# Patient Record
Sex: FEMALE | Race: ASIAN | NOT HISPANIC OR LATINO | Employment: FULL TIME | URBAN - METROPOLITAN AREA
[De-identification: names, ages, dates, MRNs, and addresses within clinical notes are randomized per-mention and may not be internally consistent; named-entity substitution may affect disease eponyms.]

---

## 2020-12-10 PROCEDURE — U0004 COV-19 TEST NON-CDC HGH THRU: HCPCS | Performed by: PHYSICIAN ASSISTANT

## 2021-12-14 ENCOUNTER — LAB (OUTPATIENT)
Dept: LAB | Facility: HOSPITAL | Age: 31
End: 2021-12-14

## 2021-12-14 ENCOUNTER — INITIAL PRENATAL (OUTPATIENT)
Dept: OBSTETRICS AND GYNECOLOGY | Facility: CLINIC | Age: 31
End: 2021-12-14

## 2021-12-14 VITALS
HEIGHT: 59 IN | BODY MASS INDEX: 31 KG/M2 | DIASTOLIC BLOOD PRESSURE: 74 MMHG | WEIGHT: 153.8 LBS | SYSTOLIC BLOOD PRESSURE: 116 MMHG

## 2021-12-14 DIAGNOSIS — Z34.81 PRENATAL CARE, SUBSEQUENT PREGNANCY IN FIRST TRIMESTER: Primary | ICD-10-CM

## 2021-12-14 DIAGNOSIS — O09.291 HISTORY OF PREGNANCY LOSS IN PRIOR PREGNANCY, CURRENTLY PREGNANT IN FIRST TRIMESTER: ICD-10-CM

## 2021-12-14 DIAGNOSIS — Z34.81 PRENATAL CARE, SUBSEQUENT PREGNANCY IN FIRST TRIMESTER: ICD-10-CM

## 2021-12-14 PROBLEM — O09.292: Status: ACTIVE | Noted: 2021-12-14

## 2021-12-14 LAB
ABO GROUP BLD: NORMAL
AMPHET+METHAMPHET UR QL: NEGATIVE
AMPHETAMINES UR QL: NEGATIVE
BARBITURATES UR QL SCN: NEGATIVE
BASOPHILS # BLD AUTO: 0.04 10*3/MM3 (ref 0–0.2)
BASOPHILS NFR BLD AUTO: 0.5 % (ref 0–1.5)
BENZODIAZ UR QL SCN: NEGATIVE
BLD GP AB SCN SERPL QL: NEGATIVE
BUPRENORPHINE SERPL-MCNC: NEGATIVE NG/ML
C TRACH RRNA SPEC DONR QL NAA+PROBE: NORMAL
CANNABINOIDS SERPL QL: NEGATIVE
COCAINE UR QL: NEGATIVE
DEPRECATED RDW RBC AUTO: 38.7 FL (ref 37–54)
EOSINOPHIL # BLD AUTO: 0.12 10*3/MM3 (ref 0–0.4)
EOSINOPHIL NFR BLD AUTO: 1.4 % (ref 0.3–6.2)
ERYTHROCYTE [DISTWIDTH] IN BLOOD BY AUTOMATED COUNT: 11.7 % (ref 12.3–15.4)
HBV SURFACE AG SERPL QL IA: NORMAL
HCT VFR BLD AUTO: 41.4 % (ref 34–46.6)
HCV AB SER DONR QL: NORMAL
HGB BLD-MCNC: 14 G/DL (ref 12–15.9)
HIV1+2 AB SER QL: NORMAL
IMM GRANULOCYTES # BLD AUTO: 0.04 10*3/MM3 (ref 0–0.05)
IMM GRANULOCYTES NFR BLD AUTO: 0.5 % (ref 0–0.5)
LYMPHOCYTES # BLD AUTO: 1.96 10*3/MM3 (ref 0.7–3.1)
LYMPHOCYTES NFR BLD AUTO: 23.6 % (ref 19.6–45.3)
MCH RBC QN AUTO: 31 PG (ref 26.6–33)
MCHC RBC AUTO-ENTMCNC: 33.8 G/DL (ref 31.5–35.7)
MCV RBC AUTO: 91.8 FL (ref 79–97)
METHADONE UR QL SCN: NEGATIVE
MONOCYTES # BLD AUTO: 0.48 10*3/MM3 (ref 0.1–0.9)
MONOCYTES NFR BLD AUTO: 5.8 % (ref 5–12)
N GONORRHOEA DNA SPEC QL NAA+PROBE: NORMAL
NEUTROPHILS NFR BLD AUTO: 5.68 10*3/MM3 (ref 1.7–7)
NEUTROPHILS NFR BLD AUTO: 68.2 % (ref 42.7–76)
NRBC BLD AUTO-RTO: 0 /100 WBC (ref 0–0.2)
OPIATES UR QL: NEGATIVE
OXYCODONE UR QL SCN: NEGATIVE
PCP UR QL SCN: NEGATIVE
PLATELET # BLD AUTO: 244 10*3/MM3 (ref 140–450)
PMV BLD AUTO: 11.2 FL (ref 6–12)
PROPOXYPH UR QL: NEGATIVE
RBC # BLD AUTO: 4.51 10*6/MM3 (ref 3.77–5.28)
RH BLD: POSITIVE
RPR SER QL: NORMAL
TRICYCLICS UR QL SCN: NEGATIVE
WBC NRBC COR # BLD: 8.32 10*3/MM3 (ref 3.4–10.8)

## 2021-12-14 PROCEDURE — 86803 HEPATITIS C AB TEST: CPT

## 2021-12-14 PROCEDURE — 87086 URINE CULTURE/COLONY COUNT: CPT | Performed by: OBSTETRICS & GYNECOLOGY

## 2021-12-14 PROCEDURE — 90471 IMMUNIZATION ADMIN: CPT | Performed by: OBSTETRICS & GYNECOLOGY

## 2021-12-14 PROCEDURE — 80081 OBSTETRIC PANEL INC HIV TSTG: CPT

## 2021-12-14 PROCEDURE — 90686 IIV4 VACC NO PRSV 0.5 ML IM: CPT | Performed by: OBSTETRICS & GYNECOLOGY

## 2021-12-14 PROCEDURE — 87186 SC STD MICRODIL/AGAR DIL: CPT | Performed by: OBSTETRICS & GYNECOLOGY

## 2021-12-14 PROCEDURE — 80306 DRUG TEST PRSMV INSTRMNT: CPT | Performed by: OBSTETRICS & GYNECOLOGY

## 2021-12-14 PROCEDURE — 99204 OFFICE O/P NEW MOD 45 MIN: CPT | Performed by: OBSTETRICS & GYNECOLOGY

## 2021-12-14 NOTE — PROGRESS NOTES
"Subjective   Chief Complaint   Patient presents with   • Initial Prenatal Visit     New OB. LMP 10/15/21       Caden Downey is a 31 y.o. year old .  Patient's last menstrual period was 10/15/2021.  She presents to be seen to initiate prenatal care.  She reports a history of a second trimester stillbirth while she was living in the [facility] at around 28 weeks gestation.  She reports this happened right after her Tdap booster injection.    Social History    Tobacco Use      Smoking status: Never Smoker      Smokeless tobacco: Never Used      The following portions of the patient's history were reviewed and updated as appropriate:vital signs, allergies, current medications, past family history, past medical history, past social history, past surgical history and problem list.    Objective   /74   Ht 149.9 cm (59\")   Wt 69.8 kg (153 lb 12.8 oz)   LMP 10/15/2021   BMI 31.06 kg/m²     General: well developed; well nourished  no acute distress   Skin: No suspicious lesions seen   Thyroid: normal to inspection and palpation   Heart:  Not performed.   Lungs: breathing is unlabored   Breasts:  Examined in supine position  Symmetric without masses or skin dimpling  Nipples normal without inversion, lesions or discharge  There are no palpable axillary nodes   Abdomen: soft, non-tender; no masses  no umbilical or inguinal hernias are present  no hepato-splenomegaly   Pelvis: Clinical staff was present for exam  External genitalia:  normal appearance of the external genitalia including Bartholin's and [place]inchel[place]'s glands.  :  urethral meatus normal;  Vaginal:  normal pink mucosa without prolapse or lesions.  Cervix:  normal appearance.  Uterus:  normal size, shape and consistency.  Adnexa:  normal bimanual exam of the adnexa.  Rectal:  digital rectal exam not performed; anus visually normal appearing.       Lab Review   No data reviewed    Imaging   Pelvic ultrasound report    Assessment/Plan "   ASSESSMENT  1. 31 y.o. year old  at 8w4d confirmed by ultrasound today   2. Supervision of high risk pregnancy  3. History of stillbirth at 28 weeks in G1    PLAN  1. The problem list for pregnancy was initiated today  2. Tests ordered today:  Orders Placed This Encounter   Procedures   • Chlamydia trachomatis, Neisseria gonorrhoeae, Trichomonas vaginalis, PCR - Swab, Cervix     Standing Status:   Future     Standing Expiration Date:   2022     Order Specific Question:   Release to patient     Answer:   Immediate   • Urine Culture - Urine, Urine, Clean Catch     Standing Status:   Future     Standing Expiration Date:   2022     Order Specific Question:   Release to patient     Answer:   Immediate   • OB Panel With HIV     Standing Status:   Future     Standing Expiration Date:   2022     Order Specific Question:   Release to patient     Answer:   Immediate   • Hepatitis C Antibody     Standing Status:   Future     Standing Expiration Date:   2022     Order Specific Question:   Release to patient     Answer:   Immediate   • Urine Drug Screen - Urine, Clean Catch     Please confirm all positive UDS     Standing Status:   Future     Standing Expiration Date:   2022     Order Specific Question:   Release to patient     Answer:   Immediate     3. Testing for GC / Chlamydia / trichomonas was done today   4. Pap and HPV were done today.  If she does not receive the results of the Pap within 2 weeks  time, she was instructed to call to find out the results.  I explained to Caden that the recommendations for Pap smear interval in a low risk patient's has lengthened to 5 years time if both cytology and HPV testing were normal.  I encouraged her to be seen yearly for a full physical exam including breast and pelvic exam even during the off years when PAP's will not be performed.  5. Genetic testing reviewed: NIPT (Leo) and she has already decided to have testing  performed.  6. Information reviewed: exercise in pregnancy, nutrition in pregnancy, weight gain in pregnancy, work and travel restrictions during pregnancy, list of OTC medications acceptable in pregnancy, call coverage groups and she is s/p covid vaccination. Influenza vaccination kassandraosman     Total time spent today with Caden  was 45-59 minutes (level 4).  Of this time, > 50% was spent face-to-face time coordinating care, answering her questions and counseling regarding pathophysiology of her presenting problem along with plans for any diagnositc work-up and treatment.    Follow up: 4 week(s)       This note was electronically signed.    Connie Limon MD  December 14, 2021

## 2021-12-15 LAB — RUBV IGG SERPL IA-ACNC: 6.58 INDEX

## 2021-12-20 ENCOUNTER — TELEPHONE (OUTPATIENT)
Dept: OBSTETRICS AND GYNECOLOGY | Facility: CLINIC | Age: 31
End: 2021-12-20

## 2021-12-20 PROBLEM — O23.41 URINARY TRACT INFECTION IN MOTHER DURING FIRST TRIMESTER OF PREGNANCY: Status: ACTIVE | Noted: 2021-12-20

## 2021-12-20 PROBLEM — Z01.419 WELL WOMAN EXAM: Status: ACTIVE | Noted: 2021-12-20

## 2021-12-20 PROBLEM — O98.319 PREGNANCY COMPLICATED BY HPV, ANTEPARTUM: Status: ACTIVE | Noted: 2021-12-20

## 2021-12-20 PROBLEM — B97.7 PREGNANCY COMPLICATED BY HPV, ANTEPARTUM: Status: ACTIVE | Noted: 2021-12-20

## 2021-12-20 LAB
BACTERIA UR CULT: ABNORMAL
BACTERIA UR CULT: ABNORMAL
OTHER ANTIBIOTIC SUSC ISLT: ABNORMAL

## 2021-12-20 RX ORDER — CEPHALEXIN 500 MG/1
500 CAPSULE ORAL 4 TIMES DAILY
Qty: 28 CAPSULE | Refills: 0 | Status: SHIPPED | OUTPATIENT
Start: 2021-12-20 | End: 2021-12-27

## 2021-12-21 NOTE — TELEPHONE ENCOUNTER
Please inform patient her urine culture demonstrated a UTI. I am sending an antibiotic to her pharmacy to take. Thanks, Connie Limon MD     New Medications Ordered This Visit   Medications   • cephalexin (Keflex) 500 MG capsule     Sig: Take 1 capsule by mouth 4 (Four) Times a Day for 7 days.     Dispense:  28 capsule     Refill:  0

## 2022-01-11 ENCOUNTER — ROUTINE PRENATAL (OUTPATIENT)
Dept: OBSTETRICS AND GYNECOLOGY | Facility: CLINIC | Age: 32
End: 2022-01-11

## 2022-01-11 VITALS — SYSTOLIC BLOOD PRESSURE: 122 MMHG | DIASTOLIC BLOOD PRESSURE: 82 MMHG | BODY MASS INDEX: 32.19 KG/M2 | WEIGHT: 159.4 LBS

## 2022-01-11 DIAGNOSIS — O98.319 PREGNANCY COMPLICATED BY HPV, ANTEPARTUM: ICD-10-CM

## 2022-01-11 DIAGNOSIS — B97.7 PREGNANCY COMPLICATED BY HPV, ANTEPARTUM: ICD-10-CM

## 2022-01-11 DIAGNOSIS — Z34.82 PRENATAL CARE, SUBSEQUENT PREGNANCY IN SECOND TRIMESTER: Primary | ICD-10-CM

## 2022-01-11 DIAGNOSIS — O23.41 URINARY TRACT INFECTION IN MOTHER DURING FIRST TRIMESTER OF PREGNANCY: ICD-10-CM

## 2022-01-11 DIAGNOSIS — O09.291 HISTORY OF PREGNANCY LOSS IN PRIOR PREGNANCY, CURRENTLY PREGNANT IN FIRST TRIMESTER: ICD-10-CM

## 2022-01-11 PROCEDURE — 99213 OFFICE O/P EST LOW 20 MIN: CPT | Performed by: OBSTETRICS & GYNECOLOGY

## 2022-01-11 PROCEDURE — 87086 URINE CULTURE/COLONY COUNT: CPT | Performed by: OBSTETRICS & GYNECOLOGY

## 2022-01-11 NOTE — PROGRESS NOTES
Chief Complaint   Patient presents with   • Routine Prenatal Visit     pt states that she had pain in her ovary area on the left side two days last week but nothing since. pt also stated that her back is huring on both sides       HPI: Caden is a  currently at 12w4d who today reports the following:  Nausea - No; Vaginal bleeding -  No; Heartburn - No.    ROS:  GI: Constipation - No; Diarrhea - No    Neuro: Headache - No; Visual change - No      EXAM:  Vitals: See prenatal flowsheet   Abdomen: See prenatal flowsheet   Urine glucose/protein: See prenatal flowsheet   Pelvic: See prenatal flowsheet     Prenatal Labs  Lab Results   Component Value Date    HGB 14.0 2021    RUBELLAABIGG 6.58 2021    HEPBSAG Non-Reactive 2021    ABSCRN Negative 2021    BTO8NWA6 Non-Reactive 2021    HEPCVIRUSABY Non-Reactive 2021    URINECX Final report (A) 2021    CHLAMNAA neg 2021    NGONORRHON neg 2021       MDM:  Impression: 1. Supervision of high risk pregnancy  2. History of stillbirth at 28 weeks in G1  3. History of UTI s/p treatment       Tests done today: 1. Urine culture CARIE   2. Panorama NIPT   Topics discussed: 1. Continue with PNV's  2. Prenatal labs reviewed  3.  labor signs and symptoms   Tests scheduled today for her next visit:   none

## 2022-01-12 LAB — BACTERIA SPEC AEROBE CULT: NO GROWTH

## 2022-01-19 ENCOUNTER — TELEPHONE (OUTPATIENT)
Dept: OBSTETRICS AND GYNECOLOGY | Facility: CLINIC | Age: 32
End: 2022-01-19

## 2022-01-19 DIAGNOSIS — O28.5 ABNORMAL CHROMOSOMAL AND GENETIC FINDING ON ANTENATAL SCREENING MOTHER: Primary | ICD-10-CM

## 2022-01-19 NOTE — TELEPHONE ENCOUNTER
YARELY MARQUEZ REPRESENTATIVE FROM [facility] CALLED TO REPORT A HIGH RISK RESULT ON HÉCTOR ARTURO.     THEY CAN BE REACHED @ [phone] AND CAN SPEAK TO ANY REPRESENTATIVE.

## 2022-01-19 NOTE — TELEPHONE ENCOUNTER
Dr. Limon's patient 13w5d  [phone] Called [facility] and spoke with a ([facility]) genetic counselor regarding patient's NIPT. Panaorama is more complicated it is positive for Trisomy 13 (68:100) in addition no result for sex chromosome due to an atypical finding of the Y chromosome. Recommended genetic counseling and diagnostic testing for further evaluation and workup for these findings.

## 2022-01-19 NOTE — TELEPHONE ENCOUNTER
Attempted to call patient back but no answer.    Please let me know when it is a good time to call patient.  I am going to go ahead and put in a PDC ultrasound and consult for her to get set up with them to discuss further if we can get that scheduled.    Orders Placed This Encounter   Procedures   • [facility]     Standing Status:   Future     Standing Expiration Date:   2023     Order Specific Question:   Reason for Exam:     Answer:   high risk for T13 on cffdna     Thanks, Connie Limon MD

## 2022-01-20 NOTE — TELEPHONE ENCOUNTER
Called patient and reviewed high risk trisomy 13 cell free fetal DNA result.  Reviewed recommendation to seek genetic counseling and then MFM referral for discussion of possible amniocentesis and ultrasound.    Can we please get patient scheduled with PDC within the next week to discuss this?  Order is already been placed.  Can you please inform patient that she can call the number [phone] to talk with a [facility] genetic counselor.    Thanks, Connie Limon MD

## 2022-01-21 ENCOUNTER — HOSPITAL ENCOUNTER (OUTPATIENT)
Dept: WOMENS IMAGING | Facility: HOSPITAL | Age: 32
Discharge: HOME | End: 2022-01-21
Admitting: OBSTETRICS & GYNECOLOGY

## 2022-01-21 ENCOUNTER — OFFICE VISIT (OUTPATIENT)
Dept: OBSTETRICS AND GYNECOLOGY | Facility: HOSPITAL | Age: 32
End: 2022-01-21

## 2022-01-21 VITALS — DIASTOLIC BLOOD PRESSURE: 82 MMHG | SYSTOLIC BLOOD PRESSURE: 143 MMHG | WEIGHT: 157 LBS | BODY MASS INDEX: 31.71 KG/M2

## 2022-01-21 DIAGNOSIS — O28.5 ABNORMAL CHROMOSOMAL AND GENETIC FINDING ON ANTENATAL SCREENING MOTHER: ICD-10-CM

## 2022-01-21 DIAGNOSIS — O09.291 HISTORY OF PREGNANCY LOSS IN PRIOR PREGNANCY, CURRENTLY PREGNANT IN FIRST TRIMESTER: Primary | ICD-10-CM

## 2022-01-21 PROCEDURE — 99215 OFFICE O/P EST HI 40 MIN: CPT | Performed by: OBSTETRICS & GYNECOLOGY

## 2022-01-21 PROCEDURE — 76815 OB US LIMITED FETUS(S): CPT | Performed by: OBSTETRICS & GYNECOLOGY

## 2022-01-21 PROCEDURE — 76815 OB US LIMITED FETUS(S): CPT

## 2022-01-21 NOTE — PROGRESS NOTES
Patient seen in Maternal Fetal Medicine clinic today. Please see full note in under imaging tab of patient chart in Epic (Viewpoint report).    Kezia Moore MD

## 2022-01-21 NOTE — PROGRESS NOTES
Pt denies problems with pregnancy.  Next OB appt 2/9/22. Abnormal NIPS-high risk T13 and atypical finding for Y chromosome.

## 2022-01-22 ENCOUNTER — TELEPHONE (OUTPATIENT)
Dept: OBSTETRICS AND GYNECOLOGY | Facility: CLINIC | Age: 32
End: 2022-01-22

## 2022-01-22 DIAGNOSIS — O28.5 ABNORMAL CHROMOSOMAL AND GENETIC FINDING ON ANTENATAL SCREENING MOTHER: ICD-10-CM

## 2022-01-22 DIAGNOSIS — O02.1 MISSED ABORTION: Primary | ICD-10-CM

## 2022-01-23 NOTE — TELEPHONE ENCOUNTER
Discussed findings of PDC scan with patient yesterday evening and all questions answered.   Discussed recommendation for D&C with patient and she verbalized understanding.   Reviewed laboratory recommendations from Dr. Moore.   Patient also desires genetics from products of conception.     Can we please get patient scheduled for suction dilation and curettage this week. Based on my current schedule we can try 0730 start time Thursday AM or Friday AM. Lets try [facility] but if they don't have openings then let me know and maybe we can try the main OR.     Patient also needs to present to the lab this week for below labs     Orders Placed This Encounter   Procedures   • External Facility Surgical/Procedural Request     Standing Status:   Future     Standing Expiration Date:   2023     Order Specific Question:   Requested Location     Answer:   [facility]     Order Specific Question:   Procedure/ Order for Consent     Answer:   Suction dilation and curettage     Order Specific Question:   Laterality     Answer:   Not Applicable     Order Specific Question:   Anesthesia type     Answer:   General [80]     Order Specific Question:   Pre-op diagnosis     Answer:   missed  - 12 weeks   • Parvovirus B19 Antibody, IgG & IgM     Standing Status:   Future     Standing Expiration Date:   2023     Order Specific Question:   Release to patient     Answer:   Immediate   • Cytomegalovirus Antibody, IgG     Standing Status:   Future     Standing Expiration Date:   2023     Order Specific Question:   Release to patient     Answer:   Immediate   • Cytomegalovirus Antibody, IgM     Standing Status:   Future     Standing Expiration Date:   2023     Order Specific Question:   Release to patient     Answer:   Immediate   • Toxoplasma Antibodies IgG / IgM     Standing Status:   Future     Standing Expiration Date:   2023     Order Specific Question:   Release to patient     Answer:   Immediate   • Beta-2  Glycoprotein Antibodies     Standing Status:   Future     Standing Expiration Date:   1/22/2023     Order Specific Question:   Release to patient     Answer:   Immediate   • Lupus Anticoagulant     Standing Status:   Future     Standing Expiration Date:   1/22/2023     Order Specific Question:   Release to patient     Answer:   Immediate   • Anticardiolipin Antibody, IgG / M, Qn     Standing Status:   Future     Standing Expiration Date:   1/22/2023     Order Specific Question:   Release to patient     Answer:   Immediate   • TSH Rfx On Abnormal To Free T4     Standing Status:   Future     Standing Expiration Date:   1/22/2023     Order Specific Question:   Release to patient     Answer:   Immediate   • Hemoglobin A1c     Standing Status:   Future     Standing Expiration Date:   1/22/2023     Order Specific Question:   Release to patient     Answer:   Immediate   • CBC (No Diff)     Standing Status:   Future     Standing Expiration Date:   1/22/2023     Order Specific Question:   Release to patient     Answer:   Immediate   • Comprehensive Metabolic Panel     Standing Status:   Future     Standing Expiration Date:   1/22/2023     Order Specific Question:   Release to patient     Answer:   Immediate   • ABO / Rh     Standing Status:   Future     Standing Expiration Date:   1/22/2023     Order Specific Question:   Release to patient     Answer:   Immediate   • Antibody Screen     Standing Status:   Future     Standing Expiration Date:   1/22/2023     Order Specific Question:   Release to patient     Answer:   Immediate     Thanks, Connie Limon MD

## 2022-01-24 ENCOUNTER — ANESTHESIA EVENT (OUTPATIENT)
Dept: PERIOP | Facility: HOSPITAL | Age: 32
End: 2022-01-24

## 2022-01-24 ENCOUNTER — PREP FOR SURGERY (OUTPATIENT)
Dept: OTHER | Facility: HOSPITAL | Age: 32
End: 2022-01-24

## 2022-01-24 ENCOUNTER — PRE-ADMISSION TESTING (OUTPATIENT)
Dept: PREADMISSION TESTING | Facility: HOSPITAL | Age: 32
End: 2022-01-24

## 2022-01-24 ENCOUNTER — TELEPHONE (OUTPATIENT)
Dept: OBSTETRICS AND GYNECOLOGY | Facility: CLINIC | Age: 32
End: 2022-01-24

## 2022-01-24 VITALS — WEIGHT: 154.76 LBS | BODY MASS INDEX: 31.2 KG/M2 | HEIGHT: 59 IN

## 2022-01-24 DIAGNOSIS — O02.1 MISSED ABORTION: Primary | ICD-10-CM

## 2022-01-24 DIAGNOSIS — Z01.818 PRE-OP TESTING: Primary | ICD-10-CM

## 2022-01-24 DIAGNOSIS — O28.5 ABNORMAL CHROMOSOMAL AND GENETIC FINDING ON ANTENATAL SCREENING MOTHER: ICD-10-CM

## 2022-01-24 DIAGNOSIS — O02.1 MISSED ABORTION: ICD-10-CM

## 2022-01-24 LAB
ABO GROUP BLD: NORMAL
ALBUMIN SERPL-MCNC: 4.1 G/DL (ref 3.5–5.2)
ALBUMIN/GLOB SERPL: 1.4 G/DL
ALP SERPL-CCNC: 44 U/L (ref 39–117)
ALT SERPL W P-5'-P-CCNC: 46 U/L (ref 1–33)
ANION GAP SERPL CALCULATED.3IONS-SCNC: 11 MMOL/L (ref 5–15)
AST SERPL-CCNC: 28 U/L (ref 1–32)
BILIRUB SERPL-MCNC: 0.2 MG/DL (ref 0–1.2)
BLD GP AB SCN SERPL QL: NEGATIVE
BUN SERPL-MCNC: 8 MG/DL (ref 6–20)
BUN/CREAT SERPL: 14 (ref 7–25)
CALCIUM SPEC-SCNC: 8.9 MG/DL (ref 8.6–10.5)
CHLORIDE SERPL-SCNC: 103 MMOL/L (ref 98–107)
CO2 SERPL-SCNC: 22 MMOL/L (ref 22–29)
CREAT SERPL-MCNC: 0.57 MG/DL (ref 0.57–1)
DEPRECATED RDW RBC AUTO: 38.4 FL (ref 37–54)
ERYTHROCYTE [DISTWIDTH] IN BLOOD BY AUTOMATED COUNT: 11.7 % (ref 12.3–15.4)
GFR SERPL CREATININE-BSD FRML MDRD: 122 ML/MIN/1.73
GFR SERPL CREATININE-BSD FRML MDRD: 148 ML/MIN/1.73
GLOBULIN UR ELPH-MCNC: 3 GM/DL
GLUCOSE SERPL-MCNC: 95 MG/DL (ref 65–99)
HBA1C MFR BLD: 5.8 % (ref 4.8–5.6)
HCT VFR BLD AUTO: 36.9 % (ref 34–46.6)
HGB BLD-MCNC: 12.6 G/DL (ref 12–15.9)
MCH RBC QN AUTO: 30.5 PG (ref 26.6–33)
MCHC RBC AUTO-ENTMCNC: 34.1 G/DL (ref 31.5–35.7)
MCV RBC AUTO: 89.3 FL (ref 79–97)
PLATELET # BLD AUTO: 240 10*3/MM3 (ref 140–450)
PMV BLD AUTO: 9.7 FL (ref 6–12)
POTASSIUM SERPL-SCNC: 3.6 MMOL/L (ref 3.5–5.2)
PROT SERPL-MCNC: 7.1 G/DL (ref 6–8.5)
RBC # BLD AUTO: 4.13 10*6/MM3 (ref 3.77–5.28)
RH BLD: POSITIVE
SARS-COV-2 RNA PNL SPEC NAA+PROBE: NOT DETECTED
SODIUM SERPL-SCNC: 136 MMOL/L (ref 136–145)
TSH SERPL DL<=0.05 MIU/L-ACNC: 0.75 UIU/ML (ref 0.27–4.2)
WBC NRBC COR # BLD: 7.46 10*3/MM3 (ref 3.4–10.8)

## 2022-01-24 PROCEDURE — 85670 THROMBIN TIME PLASMA: CPT

## 2022-01-24 PROCEDURE — 85613 RUSSELL VIPER VENOM DILUTED: CPT

## 2022-01-24 PROCEDURE — 85705 THROMBOPLASTIN INHIBITION: CPT

## 2022-01-24 PROCEDURE — C9803 HOPD COVID-19 SPEC COLLECT: HCPCS | Performed by: OBSTETRICS & GYNECOLOGY

## 2022-01-24 PROCEDURE — 36415 COLL VENOUS BLD VENIPUNCTURE: CPT

## 2022-01-24 PROCEDURE — 80050 GENERAL HEALTH PANEL: CPT

## 2022-01-24 PROCEDURE — 86147 CARDIOLIPIN ANTIBODY EA IG: CPT

## 2022-01-24 PROCEDURE — 83036 HEMOGLOBIN GLYCOSYLATED A1C: CPT

## 2022-01-24 PROCEDURE — U0004 COV-19 TEST NON-CDC HGH THRU: HCPCS | Performed by: OBSTETRICS & GYNECOLOGY

## 2022-01-24 PROCEDURE — 86777 TOXOPLASMA ANTIBODY: CPT

## 2022-01-24 PROCEDURE — 86900 BLOOD TYPING SEROLOGIC ABO: CPT

## 2022-01-24 PROCEDURE — 86901 BLOOD TYPING SEROLOGIC RH(D): CPT

## 2022-01-24 PROCEDURE — 86644 CMV ANTIBODY: CPT

## 2022-01-24 PROCEDURE — 85732 THROMBOPLASTIN TIME PARTIAL: CPT

## 2022-01-24 PROCEDURE — 86645 CMV ANTIBODY IGM: CPT

## 2022-01-24 PROCEDURE — 86146 BETA-2 GLYCOPROTEIN ANTIBODY: CPT

## 2022-01-24 PROCEDURE — 86747 PARVOVIRUS ANTIBODY: CPT

## 2022-01-24 PROCEDURE — 86850 RBC ANTIBODY SCREEN: CPT

## 2022-01-24 PROCEDURE — 86778 TOXOPLASMA ANTIBODY IGM: CPT

## 2022-01-24 RX ORDER — SODIUM CHLORIDE 0.9 % (FLUSH) 0.9 %
3 SYRINGE (ML) INJECTION EVERY 12 HOURS SCHEDULED
Status: CANCELLED | OUTPATIENT
Start: 2022-01-24

## 2022-01-24 RX ORDER — SODIUM CHLORIDE 0.9 % (FLUSH) 0.9 %
10 SYRINGE (ML) INJECTION AS NEEDED
Status: CANCELLED | OUTPATIENT
Start: 2022-01-24

## 2022-01-24 RX ORDER — FAMOTIDINE 10 MG/ML
20 INJECTION, SOLUTION INTRAVENOUS ONCE
Status: CANCELLED | OUTPATIENT
Start: 2022-01-24 | End: 2022-01-24

## 2022-01-24 NOTE — TELEPHONE ENCOUNTER
Spoke with patient and advised her of her surgery time and what time she needs to be there.  Patient was also advised about her appointment with CARMELITA today at 10:15.  She verbalized understanding and had no questions at this time.

## 2022-01-24 NOTE — ANESTHESIA PREPROCEDURE EVALUATION
Anesthesia Evaluation     Patient summary reviewed and Nursing notes reviewed   no history of anesthetic complications:  NPO Solid Status: > 8 hours  NPO Liquid Status: > 8 hours           Airway   Mallampati: II  TM distance: >3 FB  Neck ROM: full  No difficulty expected  Dental - normal exam     Pulmonary - negative pulmonary ROS and normal exam   Cardiovascular - negative cardio ROS and normal exam        Neuro/Psych- negative ROS  GI/Hepatic/Renal/Endo - negative ROS     Musculoskeletal (-) negative ROS    Abdominal    Substance History      OB/GYN          Other - negative ROS                     Anesthesia Plan    ASA 2     general     intravenous induction     Anesthetic plan, all risks, benefits, and alternatives have been provided, discussed and informed consent has been obtained with: patient.    Plan discussed with CRNA.        CODE STATUS:

## 2022-01-24 NOTE — H&P
Subjective   CC: missed   Caden Downey is a 31 y.o. year old  who is scheduled  for surgery due to missed  measuring 12 weeks 3 days with concern for Trisomy 13 on NIPT Panorama. She has been asymptomatic.     Past Medical History:   Diagnosis Date   • Miscarriage    • Wears glasses      No past surgical history on file.  OB History    Para Term  AB Living   2 1 0 1 0 0   SAB IAB Ectopic Molar Multiple Live Births   0 0 0 0 0 0      # Outcome Date GA Lbr Joey/2nd Weight Sex Delivery Anes PTL Lv   2 Current            1   28w0d   F    FD      Birth Comments: unexplained cause of demise in [facility]      Obstetric Comments   FOB#1-G1   FOB#2-G2     Social History     Socioeconomic History   • Marital status:    Tobacco Use   • Smoking status: Never Smoker   • Smokeless tobacco: Never Used   Substance and Sexual Activity   • Alcohol use: Not Currently     Comment: socially when not pregnant   • Drug use: Never   • Sexual activity: Yes     Partners: Male     Birth control/protection: None     Prior to Admission medications    Medication Sig Start Date End Date Taking? Authorizing Provider   Prenatal MV & Min w/FA-DHA (ONE A DAY PRENATAL PO) Take  by mouth.  22  Provider, MD Devi       No Known Allergies  Social History    Tobacco Use      Smoking status: Never Smoker      Smokeless tobacco: Never Used    Review of Systems   Constitutional: Negative for chills and fever.   HENT: Negative for congestion and sore throat.    Respiratory: Negative for shortness of breath and wheezing.    Cardiovascular: Negative for chest pain and palpitations.   Gastrointestinal: Negative for abdominal pain, nausea and vomiting.   Genitourinary: Negative for frequency, vaginal bleeding and vaginal pain.   Musculoskeletal: Negative for back pain and myalgias.   Neurological: Negative for dizziness, light-headedness and headaches.   Psychiatric/Behavioral: Negative for  confusion. The patient is not nervous/anxious.          Objective   LMP 10/15/2021   General: well developed; well nourished  no acute distress   Alert and oriented x3   Heart: Not performed.   Lungs: breathing is unlabored   Abdomen: soft, non-tender; no masses  no umbilical or inguinal hernias are present  no hepato-splenomegaly   Pelvis:: Not performed.        Assessment   1. Missed  - measuring 12 weeks 3 days  2. High risk for Trisomy 13 on NIPT     Plan   1. Suction dilation and curettage.   Today I discussed with Caden the risks of her upcoming suction D & C. Risks including intraoperative bleeding, uterine infection, perforation of the uterine cavity, failure to fully remove all the products of conception, laceration of the cervix, catheter induced urinary tract infections and the small risk for deep vein thrombosis were all explained. Additionally, the small risk for reoperation in the event of unanticipated bleeding or surgical injury was discussed.  All of her questions were answered fully.  She left with a very clear understanding of the preoperative surgical indications and the nature of a suction D & C.  She understands to be NPO after midnight and to be at the preoperative area ~ 1 hour prior to the scheduled surgical start time.  Previous IUFD labs ordered  Type and screen day of surgery   Doxycycline 200mg within one hour prior to procedure per [facility] guidliness  Will send products of conception for Anora microarray genetic testing           Connie Limon MD  2022       (Pt's PCP is Provider, No Known)

## 2022-01-25 ENCOUNTER — ANESTHESIA (OUTPATIENT)
Dept: PERIOP | Facility: HOSPITAL | Age: 32
End: 2022-01-25

## 2022-01-25 ENCOUNTER — HOSPITAL ENCOUNTER (OUTPATIENT)
Facility: HOSPITAL | Age: 32
Discharge: HOME | End: 2022-01-25
Attending: OBSTETRICS & GYNECOLOGY | Admitting: OBSTETRICS & GYNECOLOGY

## 2022-01-25 VITALS
RESPIRATION RATE: 16 BRPM | BODY MASS INDEX: 31.2 KG/M2 | HEART RATE: 92 BPM | SYSTOLIC BLOOD PRESSURE: 108 MMHG | OXYGEN SATURATION: 96 % | TEMPERATURE: 98.2 F | DIASTOLIC BLOOD PRESSURE: 55 MMHG | HEIGHT: 59 IN | WEIGHT: 154.76 LBS

## 2022-01-25 DIAGNOSIS — O28.5 ABNORMAL CHROMOSOMAL AND GENETIC FINDING ON ANTENATAL SCREENING MOTHER: ICD-10-CM

## 2022-01-25 DIAGNOSIS — O02.1 MISSED ABORTION WITH FETAL DEMISE BEFORE 20 COMPLETED WEEKS OF GESTATION: Primary | ICD-10-CM

## 2022-01-25 DIAGNOSIS — O02.1 MISSED ABORTION: ICD-10-CM

## 2022-01-25 DIAGNOSIS — Z98.890 POST-OPERATIVE STATE: ICD-10-CM

## 2022-01-25 PROBLEM — O23.41 URINARY TRACT INFECTION IN MOTHER DURING FIRST TRIMESTER OF PREGNANCY: Status: RESOLVED | Noted: 2021-12-20 | Resolved: 2022-01-25

## 2022-01-25 PROBLEM — B97.7 PREGNANCY COMPLICATED BY HPV, ANTEPARTUM: Status: RESOLVED | Noted: 2021-12-20 | Resolved: 2022-01-25

## 2022-01-25 PROBLEM — Z34.82 PRENATAL CARE, SUBSEQUENT PREGNANCY IN SECOND TRIMESTER: Status: RESOLVED | Noted: 2021-12-14 | Resolved: 2022-01-25

## 2022-01-25 PROBLEM — O98.319 PREGNANCY COMPLICATED BY HPV, ANTEPARTUM: Status: RESOLVED | Noted: 2021-12-20 | Resolved: 2022-01-25

## 2022-01-25 LAB
ABO GROUP BLD: NORMAL
BLD GP AB SCN SERPL QL: NEGATIVE
CARDIOLIPIN IGG SER IA-ACNC: <9 GPL U/ML (ref 0–14)
CARDIOLIPIN IGM SER IA-ACNC: 10 MPL U/ML (ref 0–12)
CMV IGG SERPL IA-ACNC: >10 U/ML (ref 0–0.59)
CMV IGM SERPL IA-ACNC: <30 AU/ML (ref 0–29.9)
LABORATORY COMMENT REPORT: NORMAL
RH BLD: POSITIVE
T GONDII IGG SERPL IA-ACNC: <3 IU/ML (ref 0–7.1)
T GONDII IGM SER IA-ACNC: <3 AU/ML (ref 0–7.9)
T&S EXPIRATION DATE: NORMAL

## 2022-01-25 PROCEDURE — 25010000002 ONDANSETRON PER 1 MG: Performed by: NURSE ANESTHETIST, CERTIFIED REGISTERED

## 2022-01-25 PROCEDURE — 88305 TISSUE EXAM BY PATHOLOGIST: CPT | Performed by: OBSTETRICS & GYNECOLOGY

## 2022-01-25 PROCEDURE — 86900 BLOOD TYPING SEROLOGIC ABO: CPT | Performed by: OBSTETRICS & GYNECOLOGY

## 2022-01-25 PROCEDURE — 25010000002 FENTANYL CITRATE (PF) 50 MCG/ML SOLUTION

## 2022-01-25 PROCEDURE — 25010000002 DEXAMETHASONE PER 1 MG: Performed by: NURSE ANESTHETIST, CERTIFIED REGISTERED

## 2022-01-25 PROCEDURE — 59820 CARE OF MISCARRIAGE: CPT | Performed by: OBSTETRICS & GYNECOLOGY

## 2022-01-25 PROCEDURE — 25010000002 FENTANYL CITRATE (PF) 50 MCG/ML SOLUTION: Performed by: NURSE ANESTHETIST, CERTIFIED REGISTERED

## 2022-01-25 PROCEDURE — 25010000002 METHYLERGONOVINE MALEATE PER 0.2 MG: Performed by: NURSE ANESTHETIST, CERTIFIED REGISTERED

## 2022-01-25 PROCEDURE — 86901 BLOOD TYPING SEROLOGIC RH(D): CPT | Performed by: OBSTETRICS & GYNECOLOGY

## 2022-01-25 PROCEDURE — 25010000002 MIDAZOLAM PER 1 MG: Performed by: NURSE ANESTHETIST, CERTIFIED REGISTERED

## 2022-01-25 PROCEDURE — 86850 RBC ANTIBODY SCREEN: CPT | Performed by: OBSTETRICS & GYNECOLOGY

## 2022-01-25 PROCEDURE — 25010000002 PROPOFOL 10 MG/ML EMULSION: Performed by: NURSE ANESTHETIST, CERTIFIED REGISTERED

## 2022-01-25 PROCEDURE — 25010000002 PHENYLEPHRINE 10 MG/ML SOLUTION: Performed by: NURSE ANESTHETIST, CERTIFIED REGISTERED

## 2022-01-25 RX ORDER — PROPOFOL 10 MG/ML
VIAL (ML) INTRAVENOUS AS NEEDED
Status: DISCONTINUED | OUTPATIENT
Start: 2022-01-25 | End: 2022-01-25 | Stop reason: SURG

## 2022-01-25 RX ORDER — DROPERIDOL 2.5 MG/ML
0.62 INJECTION, SOLUTION INTRAMUSCULAR; INTRAVENOUS ONCE AS NEEDED
Status: DISCONTINUED | OUTPATIENT
Start: 2022-01-25 | End: 2022-01-25 | Stop reason: HOSPADM

## 2022-01-25 RX ORDER — MIDAZOLAM HYDROCHLORIDE 1 MG/ML
1 INJECTION INTRAMUSCULAR; INTRAVENOUS
Status: DISCONTINUED | OUTPATIENT
Start: 2022-01-25 | End: 2022-01-25 | Stop reason: HOSPADM

## 2022-01-25 RX ORDER — FENTANYL CITRATE 50 UG/ML
INJECTION, SOLUTION INTRAMUSCULAR; INTRAVENOUS
Status: COMPLETED
Start: 2022-01-25 | End: 2022-01-25

## 2022-01-25 RX ORDER — OXYCODONE HYDROCHLORIDE 5 MG/1
5 TABLET ORAL EVERY 6 HOURS PRN
Qty: 10 TABLET | Refills: 0 | Status: SHIPPED | OUTPATIENT
Start: 2022-01-25 | End: 2022-02-09

## 2022-01-25 RX ORDER — LABETALOL HYDROCHLORIDE 5 MG/ML
5 INJECTION, SOLUTION INTRAVENOUS
Status: DISCONTINUED | OUTPATIENT
Start: 2022-01-25 | End: 2022-01-25 | Stop reason: HOSPADM

## 2022-01-25 RX ORDER — DOCUSATE SODIUM 100 MG/1
100 CAPSULE, LIQUID FILLED ORAL 2 TIMES DAILY PRN
Qty: 60 CAPSULE | Refills: 1 | Status: SHIPPED | OUTPATIENT
Start: 2022-01-25 | End: 2022-02-09

## 2022-01-25 RX ORDER — IBUPROFEN 600 MG/1
600 TABLET ORAL EVERY 6 HOURS PRN
Qty: 60 TABLET | Refills: 1 | Status: SHIPPED | OUTPATIENT
Start: 2022-01-25 | End: 2022-02-09

## 2022-01-25 RX ORDER — PHENYLEPHRINE HYDROCHLORIDE 10 MG/ML
INJECTION INTRAVENOUS AS NEEDED
Status: DISCONTINUED | OUTPATIENT
Start: 2022-01-25 | End: 2022-01-25 | Stop reason: SURG

## 2022-01-25 RX ORDER — PROMETHAZINE HYDROCHLORIDE 25 MG/1
25 TABLET ORAL ONCE AS NEEDED
Status: DISCONTINUED | OUTPATIENT
Start: 2022-01-25 | End: 2022-01-25 | Stop reason: HOSPADM

## 2022-01-25 RX ORDER — MEPERIDINE HYDROCHLORIDE 25 MG/ML
12.5 INJECTION INTRAMUSCULAR; INTRAVENOUS; SUBCUTANEOUS
Status: DISCONTINUED | OUTPATIENT
Start: 2022-01-25 | End: 2022-01-25 | Stop reason: HOSPADM

## 2022-01-25 RX ORDER — IPRATROPIUM BROMIDE AND ALBUTEROL SULFATE 2.5; .5 MG/3ML; MG/3ML
3 SOLUTION RESPIRATORY (INHALATION) ONCE AS NEEDED
Status: DISCONTINUED | OUTPATIENT
Start: 2022-01-25 | End: 2022-01-25 | Stop reason: HOSPADM

## 2022-01-25 RX ORDER — ONDANSETRON 2 MG/ML
4 INJECTION INTRAMUSCULAR; INTRAVENOUS ONCE AS NEEDED
Status: DISCONTINUED | OUTPATIENT
Start: 2022-01-25 | End: 2022-01-25 | Stop reason: HOSPADM

## 2022-01-25 RX ORDER — SODIUM CHLORIDE 0.9 % (FLUSH) 0.9 %
10 SYRINGE (ML) INJECTION AS NEEDED
Status: DISCONTINUED | OUTPATIENT
Start: 2022-01-25 | End: 2022-01-25 | Stop reason: HOSPADM

## 2022-01-25 RX ORDER — HYDROCODONE BITARTRATE AND ACETAMINOPHEN 5; 325 MG/1; MG/1
TABLET ORAL
Status: COMPLETED
Start: 2022-01-25 | End: 2022-01-25

## 2022-01-25 RX ORDER — HYDROMORPHONE HYDROCHLORIDE 1 MG/ML
0.5 INJECTION, SOLUTION INTRAMUSCULAR; INTRAVENOUS; SUBCUTANEOUS
Status: DISCONTINUED | OUTPATIENT
Start: 2022-01-25 | End: 2022-01-25 | Stop reason: HOSPADM

## 2022-01-25 RX ORDER — METHYLERGONOVINE MALEATE 0.2 MG/ML
INJECTION INTRAVENOUS AS NEEDED
Status: DISCONTINUED | OUTPATIENT
Start: 2022-01-25 | End: 2022-01-25 | Stop reason: SURG

## 2022-01-25 RX ORDER — SODIUM CHLORIDE 0.9 % (FLUSH) 0.9 %
3-10 SYRINGE (ML) INJECTION AS NEEDED
Status: DISCONTINUED | OUTPATIENT
Start: 2022-01-25 | End: 2022-01-25 | Stop reason: HOSPADM

## 2022-01-25 RX ORDER — SODIUM CHLORIDE, SODIUM LACTATE, POTASSIUM CHLORIDE, CALCIUM CHLORIDE 600; 310; 30; 20 MG/100ML; MG/100ML; MG/100ML; MG/100ML
INJECTION, SOLUTION INTRAVENOUS CONTINUOUS PRN
Status: DISCONTINUED | OUTPATIENT
Start: 2022-01-25 | End: 2022-01-25 | Stop reason: SURG

## 2022-01-25 RX ORDER — DROPERIDOL 2.5 MG/ML
0.62 INJECTION, SOLUTION INTRAMUSCULAR; INTRAVENOUS AS NEEDED
Status: DISCONTINUED | OUTPATIENT
Start: 2022-01-25 | End: 2022-01-25 | Stop reason: HOSPADM

## 2022-01-25 RX ORDER — SODIUM CHLORIDE, SODIUM LACTATE, POTASSIUM CHLORIDE, CALCIUM CHLORIDE 600; 310; 30; 20 MG/100ML; MG/100ML; MG/100ML; MG/100ML
9 INJECTION, SOLUTION INTRAVENOUS CONTINUOUS
Status: DISCONTINUED | OUTPATIENT
Start: 2022-01-25 | End: 2022-01-25 | Stop reason: HOSPADM

## 2022-01-25 RX ORDER — SODIUM CHLORIDE 0.9 % (FLUSH) 0.9 %
10 SYRINGE (ML) INJECTION EVERY 12 HOURS SCHEDULED
Status: DISCONTINUED | OUTPATIENT
Start: 2022-01-25 | End: 2022-01-25 | Stop reason: HOSPADM

## 2022-01-25 RX ORDER — SODIUM CHLORIDE 0.9 % (FLUSH) 0.9 %
3 SYRINGE (ML) INJECTION EVERY 12 HOURS SCHEDULED
Status: DISCONTINUED | OUTPATIENT
Start: 2022-01-25 | End: 2022-01-25 | Stop reason: HOSPADM

## 2022-01-25 RX ORDER — DEXAMETHASONE SODIUM PHOSPHATE 10 MG/ML
INJECTION INTRAMUSCULAR; INTRAVENOUS AS NEEDED
Status: DISCONTINUED | OUTPATIENT
Start: 2022-01-25 | End: 2022-01-25 | Stop reason: SURG

## 2022-01-25 RX ORDER — LIDOCAINE HYDROCHLORIDE 20 MG/ML
INJECTION, SOLUTION INFILTRATION; PERINEURAL AS NEEDED
Status: DISCONTINUED | OUTPATIENT
Start: 2022-01-25 | End: 2022-01-25 | Stop reason: SURG

## 2022-01-25 RX ORDER — FENTANYL CITRATE 50 UG/ML
50 INJECTION, SOLUTION INTRAMUSCULAR; INTRAVENOUS
Status: DISCONTINUED | OUTPATIENT
Start: 2022-01-25 | End: 2022-01-25 | Stop reason: HOSPADM

## 2022-01-25 RX ORDER — ONDANSETRON 2 MG/ML
INJECTION INTRAMUSCULAR; INTRAVENOUS AS NEEDED
Status: DISCONTINUED | OUTPATIENT
Start: 2022-01-25 | End: 2022-01-25 | Stop reason: SURG

## 2022-01-25 RX ORDER — HYDRALAZINE HYDROCHLORIDE 20 MG/ML
5 INJECTION INTRAMUSCULAR; INTRAVENOUS
Status: DISCONTINUED | OUTPATIENT
Start: 2022-01-25 | End: 2022-01-25 | Stop reason: HOSPADM

## 2022-01-25 RX ORDER — MISOPROSTOL 200 UG/1
200 TABLET ORAL ONCE
Status: COMPLETED | OUTPATIENT
Start: 2022-01-25 | End: 2022-01-25

## 2022-01-25 RX ORDER — LIDOCAINE HYDROCHLORIDE 10 MG/ML
0.5 INJECTION, SOLUTION EPIDURAL; INFILTRATION; INTRACAUDAL; PERINEURAL ONCE AS NEEDED
Status: COMPLETED | OUTPATIENT
Start: 2022-01-25 | End: 2022-01-25

## 2022-01-25 RX ORDER — NALOXONE HCL 0.4 MG/ML
0.4 VIAL (ML) INJECTION AS NEEDED
Status: DISCONTINUED | OUTPATIENT
Start: 2022-01-25 | End: 2022-01-25 | Stop reason: HOSPADM

## 2022-01-25 RX ORDER — FAMOTIDINE 20 MG/1
20 TABLET, FILM COATED ORAL ONCE
Status: COMPLETED | OUTPATIENT
Start: 2022-01-25 | End: 2022-01-25

## 2022-01-25 RX ORDER — MIDAZOLAM HYDROCHLORIDE 1 MG/ML
INJECTION INTRAMUSCULAR; INTRAVENOUS AS NEEDED
Status: DISCONTINUED | OUTPATIENT
Start: 2022-01-25 | End: 2022-01-25 | Stop reason: SURG

## 2022-01-25 RX ORDER — PROMETHAZINE HYDROCHLORIDE 25 MG/1
25 SUPPOSITORY RECTAL ONCE AS NEEDED
Status: DISCONTINUED | OUTPATIENT
Start: 2022-01-25 | End: 2022-01-25 | Stop reason: HOSPADM

## 2022-01-25 RX ORDER — HYDROCODONE BITARTRATE AND ACETAMINOPHEN 5; 325 MG/1; MG/1
1 TABLET ORAL ONCE AS NEEDED
Status: DISCONTINUED | OUTPATIENT
Start: 2022-01-25 | End: 2022-01-25 | Stop reason: HOSPADM

## 2022-01-25 RX ORDER — FENTANYL CITRATE 50 UG/ML
INJECTION, SOLUTION INTRAMUSCULAR; INTRAVENOUS AS NEEDED
Status: DISCONTINUED | OUTPATIENT
Start: 2022-01-25 | End: 2022-01-25 | Stop reason: SURG

## 2022-01-25 RX ADMIN — PHENYLEPHRINE HYDROCHLORIDE 100 MCG: 10 INJECTION INTRAVENOUS at 07:51

## 2022-01-25 RX ADMIN — PROPOFOL 200 MG: 10 INJECTION, EMULSION INTRAVENOUS at 07:30

## 2022-01-25 RX ADMIN — MISOPROSTOL 200 MCG: 200 TABLET ORAL at 07:17

## 2022-01-25 RX ADMIN — FAMOTIDINE 20 MG: 20 TABLET, FILM COATED ORAL at 06:40

## 2022-01-25 RX ADMIN — HYDROCODONE BITARTRATE AND ACETAMINOPHEN 1 TABLET: 5; 325 TABLET ORAL at 09:31

## 2022-01-25 RX ADMIN — FENTANYL CITRATE 100 MCG: 50 INJECTION, SOLUTION INTRAMUSCULAR; INTRAVENOUS at 07:30

## 2022-01-25 RX ADMIN — ONDANSETRON 4 MG: 2 INJECTION INTRAMUSCULAR; INTRAVENOUS at 07:33

## 2022-01-25 RX ADMIN — MIDAZOLAM HYDROCHLORIDE 2 MG: 1 INJECTION, SOLUTION INTRAMUSCULAR; INTRAVENOUS at 07:28

## 2022-01-25 RX ADMIN — METHYLERGONOVINE MALEATE 200 MCG: 0.2 INJECTION, SOLUTION INTRAMUSCULAR; INTRAVENOUS at 08:00

## 2022-01-25 RX ADMIN — FENTANYL CITRATE 50 MCG: 50 INJECTION, SOLUTION INTRAMUSCULAR; INTRAVENOUS at 09:51

## 2022-01-25 RX ADMIN — LIDOCAINE HYDROCHLORIDE 50 MG: 20 INJECTION, SOLUTION INFILTRATION; PERINEURAL at 07:30

## 2022-01-25 RX ADMIN — SODIUM CHLORIDE, POTASSIUM CHLORIDE, SODIUM LACTATE AND CALCIUM CHLORIDE: 600; 310; 30; 20 INJECTION, SOLUTION INTRAVENOUS at 07:25

## 2022-01-25 RX ADMIN — LIDOCAINE HYDROCHLORIDE 0.5 ML: 10 INJECTION, SOLUTION EPIDURAL; INFILTRATION; INTRACAUDAL; PERINEURAL at 06:40

## 2022-01-25 RX ADMIN — SODIUM CHLORIDE, POTASSIUM CHLORIDE, SODIUM LACTATE AND CALCIUM CHLORIDE 9 ML/HR: 600; 310; 30; 20 INJECTION, SOLUTION INTRAVENOUS at 06:40

## 2022-01-25 RX ADMIN — DOXYCYCLINE 200 MG: 100 INJECTION, POWDER, LYOPHILIZED, FOR SOLUTION INTRAVENOUS at 07:30

## 2022-01-25 RX ADMIN — DEXAMETHASONE SODIUM PHOSPHATE 8 MG: 10 INJECTION INTRAMUSCULAR; INTRAVENOUS at 07:33

## 2022-01-25 NOTE — ANESTHESIA PROCEDURE NOTES
Airway  Urgency: elective    Date/Time: 1/25/2022 7:31 AM  Airway not difficult    General Information and Staff    Patient location during procedure: OR  CRNA: John Ballard CRNA    Indications and Patient Condition  Indications for airway management: airway protection    Preoxygenated: yes  Mask difficulty assessment: 0 - not attempted    Final Airway Details  Final airway type: supraglottic airway      Successful airway: I-gel  Size 3    Number of attempts at approach: 1  Assessment: lips, teeth, and gum same as pre-op    Additional Comments  LMA placed without difficulty, ventilation with assist, equal breath sounds and symmetric chest rise and fall

## 2022-01-25 NOTE — ANESTHESIA POSTPROCEDURE EVALUATION
Patient: Caden Downey    Procedure Summary     Date: 22 Room / Location:  [facility] OR [address] [place] [address] [facility]    Anesthesia Start: 724 Anesthesia Stop:     Procedure: DILATATION AND CURETTAGE WITH SUCTION (N/A Vagina) Diagnosis:       Missed       Abnormal chromosomal and genetic finding on  screening mother      (Missed  [O02.1])      (Abnormal chromosomal and genetic finding on  screening mother [O28.5])    Surgeons: Connie Limon MD Provider: Laura Orta MD    Anesthesia Type: general ASA Status: 2          Anesthesia Type: general    Vitals  No vitals data found for the desired time range.          Post Anesthesia Care and Evaluation    Patient location during evaluation: PACU  Patient participation: waiting for patient participation  Level of consciousness: responsive to light touch  Airway patency: patent  Anesthetic complications: No anesthetic complications  PONV Status: NA  Cardiovascular status: hemodynamically stable and acceptable  Respiratory status: nonlabored ventilation, acceptable, nasal cannula and oral airway  Hydration status: acceptable

## 2022-01-26 LAB
APTT SCREEN TO CONFIRM RATIO: 1.28 RATIO (ref 0–1.34)
B19V IGG SER IA-ACNC: 2.1 INDEX (ref 0–0.8)
B19V IGM SER IA-ACNC: 0.1 INDEX (ref 0–0.8)
B2 GLYCOPROT1 IGA SER-ACNC: <9 GPI IGA UNITS (ref 0–25)
B2 GLYCOPROT1 IGG SER-ACNC: <9 GPI IGG UNITS (ref 0–20)
B2 GLYCOPROT1 IGM SER-ACNC: <9 GPI IGM UNITS (ref 0–32)
CONFIRM APTT/NORMAL: 34.1 SEC (ref 0–47.6)
CYTO UR: NORMAL
DRVVT SCREEN TO CONFIRM RATIO: 1.6 RATIO (ref 0.8–1.2)
LA 2 SCREEN W REFLEX-IMP: ABNORMAL
LAB AP CASE REPORT: NORMAL
LAB AP CLINICAL INFORMATION: NORMAL
MIXING DRVVT: 41.5 SEC (ref 0–40.4)
PATH REPORT.FINAL DX SPEC: NORMAL
PATH REPORT.GROSS SPEC: NORMAL
SCREEN APTT: 30.5 SEC (ref 0–51.9)
SCREEN DRVVT: 48.7 SEC (ref 0–47)
THROMBIN TIME: 17.2 SEC (ref 0–23)

## 2022-01-31 PROBLEM — R76.0 LUPUS ANTICOAGULANT POSITIVE: Status: ACTIVE | Noted: 2022-01-31

## 2022-01-31 PROBLEM — O02.1 MISSED ABORTION: Status: RESOLVED | Noted: 2022-01-24 | Resolved: 2022-01-31

## 2022-01-31 PROBLEM — O02.1 MISSED ABORTION WITH FETAL DEMISE BEFORE 20 COMPLETED WEEKS OF GESTATION: Status: RESOLVED | Noted: 2022-01-25 | Resolved: 2022-01-31

## 2022-01-31 PROBLEM — O28.5 ABNORMAL CHROMOSOMAL AND GENETIC FINDING ON ANTENATAL SCREENING MOTHER: Status: RESOLVED | Noted: 2022-01-19 | Resolved: 2022-01-31

## 2022-02-09 ENCOUNTER — OFFICE VISIT (OUTPATIENT)
Dept: OBSTETRICS AND GYNECOLOGY | Facility: CLINIC | Age: 32
End: 2022-02-09

## 2022-02-09 VITALS
WEIGHT: 153 LBS | HEIGHT: 59 IN | BODY MASS INDEX: 30.84 KG/M2 | SYSTOLIC BLOOD PRESSURE: 100 MMHG | DIASTOLIC BLOOD PRESSURE: 66 MMHG

## 2022-02-09 DIAGNOSIS — R76.0 LUPUS ANTICOAGULANT POSITIVE: ICD-10-CM

## 2022-02-09 DIAGNOSIS — Z98.890 POST-OPERATIVE STATE: Primary | ICD-10-CM

## 2022-02-09 PROCEDURE — 99024 POSTOP FOLLOW-UP VISIT: CPT | Performed by: OBSTETRICS & GYNECOLOGY

## 2022-02-09 NOTE — PROGRESS NOTES
"Subjective   Chief Complaint   Patient presents with   • Post-op     2 week post op D&C  patient states spotting off and on     Gerly AMBAR Shayan is a 32 y.o. year old  presenting to be seen for her post-operative visit.  Currently she reports no problems with eating, bowel movements, voiding, or wound drainage and pain is well controlled.    The pathology results from her procedure are in Caden's record and are benign.      OTHER THINGS SHE WANTS TO DISCUSS TODAY:  Nothing else    The following portions of the patient's history were reviewed and updated as appropriate:current medications and allergies       Objective   /66   Ht 149.9 cm (59.02\")   Wt 69.4 kg (153 lb)   LMP 10/15/2021   BMI 30.89 kg/m²     General:  well developed; well nourished  no acute distress   Abdomen: soft, non-tender; no masses  no umbilical or inguinal hernias are present  no hepato-splenomegaly   Pelvis: Clinical staff was present for exam  External genitalia:  normal appearance of the external genitalia including Bartholin's and [place]'s glands.  :  urethral meatus normal;  Vaginal:  normal pink mucosa without prolapse or lesions.  Cervix:  normal appearance.  Uterus:  normal size, shape and consistency.  Adnexa:  normal bimanual exam of the adnexa.  Rectal:  digital rectal exam not performed; anus visually normal appearing.          Assessment   1. S/P suction D & C  2. + lupus anticoagulant      Plan   1. May return to full activity with no restrictions  2. Will call [facility] to check on genetics from POCs  3. Reviewed importance of repeating lupus anticoagulant in ~ 12 weeks and utility of lovenox and ASA in future pregnancy if dx of APLS confirmed  4. The importance of keeping all planned follow-up and taking all medications as prescribed was emphasized.  5. Follow up for annual exam in ~one year.    No orders of the defined types were placed in this encounter.       Orders Placed This Encounter   Procedures   • " Lupus Anticoagulant     Standing Status:   Future     Standing Expiration Date:   2/9/2023     Order Specific Question:   Release to patient     Answer:   Immediate     This note was electronically signed.    Connie Limon MD  February 9, 2022

## 2022-02-18 ENCOUNTER — TELEPHONE (OUTPATIENT)
Dept: OBSTETRICS AND GYNECOLOGY | Facility: CLINIC | Age: 32
End: 2022-02-18

## 2022-02-18 DIAGNOSIS — Z82.79 FAMILY HISTORY OF TRISOMY 13: Primary | ICD-10-CM

## 2022-02-18 NOTE — TELEPHONE ENCOUNTER
Called patient and reviewed anora microarray analysis results from recent SAB And D & C consistent with Trisomy 13. All questions answered. Offered genetic counseling referral and patient desires.     Orders Placed This Encounter   Procedures   • Ambulatory Referral to Genetics     Referral Priority:   Routine     Referral Type:   Consultation     Referral Reason:   Specialty Services Required     Referred to Provider:   Liz Michel     Requested Specialty:   Genetics     Connie Limon MD

## 2022-03-09 ENCOUNTER — APPOINTMENT (OUTPATIENT)
Dept: WOMENS IMAGING | Facility: HOSPITAL | Age: 32
End: 2022-03-09

## 2022-03-14 ENCOUNTER — TELEPHONE (OUTPATIENT)
Dept: LABOR AND DELIVERY | Facility: HOSPITAL | Age: 32
End: 2022-03-14

## 2022-03-14 NOTE — TELEPHONE ENCOUNTER
"Spoke with Caden. She states she is doing \"good so far, much better than last month.\"  States she did receive the grief support material from the hospital, has that but did not open which I acknowledged was understandable given everything she was going through.  We discussed support group schedule and miscarriage brochure that was given.  She states states might be interested in attending support group and we discussed process.  She also states she is OK for mailings of [facility] and [facility] [employer].  I encouraged her to contact the [facility] office if she has questions, concerns or would like to talk in the future.  "

## 2022-03-31 ENCOUNTER — LAB (OUTPATIENT)
Dept: LAB | Facility: HOSPITAL | Age: 32
End: 2022-03-31

## 2022-03-31 DIAGNOSIS — R76.0 LUPUS ANTICOAGULANT POSITIVE: ICD-10-CM

## 2022-03-31 PROCEDURE — 85732 THROMBOPLASTIN TIME PARTIAL: CPT

## 2022-03-31 PROCEDURE — 85670 THROMBIN TIME PLASMA: CPT

## 2022-03-31 PROCEDURE — 85613 RUSSELL VIPER VENOM DILUTED: CPT

## 2022-03-31 PROCEDURE — 85705 THROMBOPLASTIN INHIBITION: CPT

## 2022-04-02 LAB
APTT SCREEN TO CONFIRM RATIO: 1.28 RATIO (ref 0–1.34)
CONFIRM APTT/NORMAL: 34.9 SEC (ref 0–47.6)
LA 2 SCREEN W REFLEX-IMP: NORMAL
SCREEN APTT: 36.3 SEC (ref 0–51.9)
SCREEN DRVVT: 45.4 SEC (ref 0–47)
THROMBIN TIME: 18.2 SEC (ref 0–23)

## 2022-04-19 ENCOUNTER — TELEPHONE (OUTPATIENT)
Dept: OBSTETRICS AND GYNECOLOGY | Facility: CLINIC | Age: 32
End: 2022-04-19

## 2022-04-19 DIAGNOSIS — R76.0 LUPUS ANTICOAGULANT POSITIVE: ICD-10-CM

## 2022-04-19 DIAGNOSIS — Z82.79 FAMILY HISTORY OF TRISOMY 13: Primary | ICD-10-CM

## 2022-04-19 DIAGNOSIS — Z87.59 HISTORY OF PREGNANCY LOSS, NOT CURRENTLY PREGNANT: ICD-10-CM

## 2022-04-19 NOTE — TELEPHONE ENCOUNTER
Called patient back and she has some additional questions. Recommended given her history that she meet with genetics.  Unfortunately, she states she has not heard from them even though it appears the referral was authorized.  Can we please check on this?  In addition I reviewed with her doing some preconception counseling with maternal-fetal medicine.  Referral placed if we can help get this scheduled as well?    Orders Placed This Encounter   Procedures   • Ambulatory MFM Referral to [facility]     Referral Priority:   Routine     Referral Type:   Consultation     Referral Reason:   Specialty Services Required     Number of Visits Requested:   1       Thanks, Cnonie Limon MD

## 2022-05-25 ENCOUNTER — OFFICE VISIT (OUTPATIENT)
Dept: OBSTETRICS AND GYNECOLOGY | Facility: HOSPITAL | Age: 32
End: 2022-05-25

## 2022-05-25 VITALS
RESPIRATION RATE: 20 BRPM | WEIGHT: 150 LBS | HEART RATE: 73 BPM | BODY MASS INDEX: 29.45 KG/M2 | HEIGHT: 60 IN | DIASTOLIC BLOOD PRESSURE: 85 MMHG | SYSTOLIC BLOOD PRESSURE: 130 MMHG

## 2022-05-25 DIAGNOSIS — O09.291 HISTORY OF PREGNANCY LOSS IN PRIOR PREGNANCY, CURRENTLY PREGNANT IN FIRST TRIMESTER: Primary | ICD-10-CM

## 2022-05-25 DIAGNOSIS — Z98.890 POST-OPERATIVE STATE: ICD-10-CM

## 2022-05-25 PROCEDURE — 99215 OFFICE O/P EST HI 40 MIN: CPT | Performed by: OBSTETRICS & GYNECOLOGY

## 2022-05-25 RX ORDER — MULTIPLE VITAMINS W/ MINERALS TAB 9MG-400MCG
1 TAB ORAL DAILY
COMMUNITY

## 2022-05-25 RX ORDER — MULTIVIT-MIN/IRON/FOLIC ACID/K 18-600-40
CAPSULE ORAL
COMMUNITY

## 2022-05-25 NOTE — PROGRESS NOTES
Maternal Fetal Medicine Consult    Patient Name:  Caden Donwey  :  1990  MRN:  [id number]  Date of Service:  2022  Referring Provider: Connie Limon     ID:   32 y.o.  at Unknown    Consultation due to:     Prior 28 weeks IUFD, prior SAb in the setting of T13     Pregnancy course significant for:   Patient Active Problem List   Diagnosis   • History of pregnancy loss in prior pregnancy, currently pregnant in first trimester - 28 week loss at age 16 years old in [facility]    • Well woman exam   • s/p suction dilation and curettage at 12w3d on 22 - suspected T13   • Lupus anticoagulant positive - [ ] need to repeat in ~ 12 weeks     Chief Complaint   Patient presents with   • Advice Only     Preconceptual counseling, AMA multip,APA, hx 28 wk IUFD, hx preg with trisomy 13       History of Present Illness:        32 year old  nonpregnant patient with prior 28 week IUFD in  followed by SAb of fetus with  T13 in .   Patient well known to me as I saw her in consultation with her last pregnancy (see [facility] notes)   The 28 week IUFD was of unknown etiology. Patient is concerned that it was associated with receipt of the tetanus vaccine. Care was in [facility].   After her last loss, a work-up was completed including TORCH titers and APLS labs. Her LAC was mildly elevated but then normal on repeat. Anora confirmed T13    She has no known medical problems.   Not taking a prenatal vitamin currently   Would like to conceive again   No current physical issues     Prenatal Labs   Lab Results   Component Value Date    HGB 12.6 2022    HEPBSAG Non-Reactive 2021    ABO A 2022    RH Positive 2022    ABSCRN Negative 2022    PZF9GRP9 Non-Reactive 2021    HEPCVIRUSABY Non-Reactive 2021    URINECX No growth 2022       REVIEW OF SYSTEMS   All other systems reviewed and negative     OB HISTORY    OB History    Para Term  AB  Living   2 1 0 1 1 0   SAB IAB Ectopic Molar Multiple Live Births   1 0 0 0 0 0      # Outcome Date GA Lbr Joey/2nd Weight Sex Delivery Anes PTL Lv   2 SAB 22 14w4d          1   28w0d   F    FD      Birth Comments: unexplained cause of demise in [facility]      Obstetric Comments   FOB#1-G1   FOB#2-G2    - suction D&C. Trisomy 13        PAST MEDICAL HISTORY    Past Medical History:   Diagnosis Date   • Miscarriage    • Wears glasses        PAST SURGICAL HISTORY     has a past surgical history that includes d & c with suction (N/A, 2022).    CURRENT MEDICATIONS      Current Outpatient Medications:   •  Ascorbic Acid (Vitamin C) 500 MG capsule, Take  by mouth., Disp: , Rfl:   •  multivitamin with minerals (CENTRUM ADULTS PO), Take 1 tablet by mouth Daily., Disp: , Rfl:   •  VITAMIN D PO, Take  by mouth Daily., Disp: , Rfl:     ALLERGIES    Patient has no known allergies.    SOCIAL HISTORY    Social History     Tobacco Use   Smoking Status Never Smoker   Smokeless Tobacco Never Used      Social History     Substance and Sexual Activity   Alcohol Use Not Currently    Comment: socially when not pregnant     Social History     Substance and Sexual Activity   Drug Use Never       FAMILY HISTORY  N/C     PHYSICAL EXAMINATION    Vital Signs Range for the last 24 hours  Temperature:     Temp Source:     BP: BP: (130)/(85) 130/85   Pulse: Heart Rate:  [73] 73   Respirations: Resp:  [20] 20   Weight: 68 kg (150 lb)     NAD   Alert and oriented   Normal affect   Normal pulmonary effort   Normal body habitus       Labs:    Anora: Confirmed T13     Labs:  WBC   Date Value Ref Range Status   2022 7.46 3.40 - 10.80 10*3/mm3 Final     RBC   Date Value Ref Range Status   2022 4.13 3.77 - 5.28 10*6/mm3 Final     Hemoglobin   Date Value Ref Range Status   2022 12.6 12.0 - 15.9 g/dL Final     Hematocrit   Date Value Ref Range Status   2022 36.9 34.0 - 46.6 % Final     MCV   Date Value Ref  Range Status   01/24/2022 89.3 79.0 - 97.0 fL Final     MCH   Date Value Ref Range Status   01/24/2022 30.5 26.6 - 33.0 pg Final     MCHC   Date Value Ref Range Status   01/24/2022 34.1 31.5 - 35.7 g/dL Final     RDW   Date Value Ref Range Status   01/24/2022 11.7 (L) 12.3 - 15.4 % Final     RDW-SD   Date Value Ref Range Status   01/24/2022 38.4 37.0 - 54.0 fl Final     MPV   Date Value Ref Range Status   01/24/2022 9.7 6.0 - 12.0 fL Final     Platelets   Date Value Ref Range Status   01/24/2022 240 140 - 450 10*3/mm3 Final     Neutrophil %   Date Value Ref Range Status   12/14/2021 68.2 42.7 - 76.0 % Final     Lymphocyte %   Date Value Ref Range Status   12/14/2021 23.6 19.6 - 45.3 % Final     Monocyte %   Date Value Ref Range Status   12/14/2021 5.8 5.0 - 12.0 % Final     Eosinophil %   Date Value Ref Range Status   12/14/2021 1.4 0.3 - 6.2 % Final     Basophil %   Date Value Ref Range Status   12/14/2021 0.5 0.0 - 1.5 % Final     Immature Grans %   Date Value Ref Range Status   12/14/2021 0.5 0.0 - 0.5 % Final     Neutrophils, Absolute   Date Value Ref Range Status   12/14/2021 5.68 1.70 - 7.00 10*3/mm3 Final     Lymphocytes, Absolute   Date Value Ref Range Status   12/14/2021 1.96 0.70 - 3.10 10*3/mm3 Final     Monocytes, Absolute   Date Value Ref Range Status   12/14/2021 0.48 0.10 - 0.90 10*3/mm3 Final     Eosinophils, Absolute   Date Value Ref Range Status   12/14/2021 0.12 0.00 - 0.40 10*3/mm3 Final     Basophils, Absolute   Date Value Ref Range Status   12/14/2021 0.04 0.00 - 0.20 10*3/mm3 Final     Immature Grans, Absolute   Date Value Ref Range Status   12/14/2021 0.04 0.00 - 0.05 10*3/mm3 Final     nRBC   Date Value Ref Range Status   12/14/2021 0.0 0.0 - 0.2 /100 WBC Final           Preeclampsia Panel Lab Result  Lab Results   Component Value Date    ALKPHOS 44 01/24/2022    ALT 46 (H) 01/24/2022    AST 28 01/24/2022    CREATININE 0.57 01/24/2022    BILITOT 0.2 01/24/2022       Beta 2 glycoprotein  normal   LAC elevated but normal on repeat   Anticardiolipin normal   HbA1C 5.8% (mildly elevated)   TSH normal          ASSESSMENT/PLAN:  32 y.o. female  at prior third trimester loss of unknown etiology and prior pregnancy loss secondary to T13     We reviewed her lab work up to date including the transiently elevated LAC. This is not of clinical significance as it can be transiently elevated and is not diagnostic of Antiphospholipid Antibody Syndrome.   Her HbA1C was mildly elevated and not likely high enough to have been of any clinical consequence. This was not reviewed with patient (not noted until after patient left). Consideration could be given to repeat HbA1c in several months.   We discussed the very low risk of recurrence with aneuploidy.   We discussed the possibility of NIPS vs diagnostic testing with any future pregnancy   As I have no clear etiology for the prior 28 week stillbirth, I cannot fully evaluate recurrence risks. However, everything that we have been able to test for has been normal and I would consider the risk of recurrence low at this point.   Regardless, I recommend close observation in any future pregnancy. I would like to see her at approximately 12 weeks for NT and discussion of any genetic testing she would like to pursue. In addition, I recommend detailed anatomic survey and serial growth in any future pregnancy. The patient is concerned that her first loss was associated with the tetanus vaccine. I reassured her that the TDaP, flu and Covid vaccines are not associated with fetal loss but also understand her hesitation regarding further vaccination in pregnancy. To be discussed further.   I recommended that she start a prenatal vitamin now since she is considering pregnancy       Approximately 45 min was spent in care of this patient, of which greater than 50% was spent in face-to-face consultation and coordination of care.    Kezia Moore MD     2022   12:40  EDT

## 2022-05-27 ENCOUNTER — TELEPHONE (OUTPATIENT)
Dept: OBSTETRICS AND GYNECOLOGY | Facility: CLINIC | Age: 32
End: 2022-05-27

## 2022-05-27 DIAGNOSIS — O09.291 HISTORY OF PREGNANCY LOSS IN PRIOR PREGNANCY, CURRENTLY PREGNANT IN FIRST TRIMESTER: Primary | ICD-10-CM

## 2022-05-27 NOTE — TELEPHONE ENCOUNTER
Please let patient know I reviewed her consult from maternal fetal medicine and they recommended repeating her hemoglobin A1c (order placed for this to be done at her convenience). She should also be reminded to start a prenatal vitamin.     Orders Placed This Encounter   Procedures   • Hemoglobin A1c     Standing Status:   Future     Standing Expiration Date:   5/27/2023     Order Specific Question:   Release to patient     Answer:   Immediate     Thanks, Connie Limon MD

## 2022-09-27 PROCEDURE — U0004 COV-19 TEST NON-CDC HGH THRU: HCPCS | Performed by: FAMILY MEDICINE

## 2022-09-27 PROCEDURE — 87070 CULTURE OTHR SPECIMN AEROBIC: CPT | Performed by: FAMILY MEDICINE

## 2022-09-27 PROCEDURE — 87205 SMEAR GRAM STAIN: CPT | Performed by: FAMILY MEDICINE

## 2023-03-07 ENCOUNTER — OFFICE VISIT (OUTPATIENT)
Dept: OBSTETRICS AND GYNECOLOGY | Facility: CLINIC | Age: 33
End: 2023-03-07
Payer: COMMERCIAL

## 2023-03-07 VITALS
SYSTOLIC BLOOD PRESSURE: 108 MMHG | RESPIRATION RATE: 14 BRPM | BODY MASS INDEX: 30.09 KG/M2 | WEIGHT: 149 LBS | DIASTOLIC BLOOD PRESSURE: 70 MMHG

## 2023-03-07 DIAGNOSIS — Z71.85 VACCINE COUNSELING: ICD-10-CM

## 2023-03-07 DIAGNOSIS — Z01.419 WELL WOMAN EXAM: Primary | ICD-10-CM

## 2023-03-07 DIAGNOSIS — N85.2 ENLARGED UTERUS: ICD-10-CM

## 2023-03-07 PROBLEM — R76.0 LUPUS ANTICOAGULANT POSITIVE: Status: RESOLVED | Noted: 2022-01-31 | Resolved: 2023-03-07

## 2023-03-07 PROBLEM — O09.291 HISTORY OF PREGNANCY LOSS IN PRIOR PREGNANCY, CURRENTLY PREGNANT IN FIRST TRIMESTER: Status: RESOLVED | Noted: 2021-12-14 | Resolved: 2023-03-07

## 2023-03-07 PROBLEM — Z98.890 POST-OPERATIVE STATE: Status: RESOLVED | Noted: 2022-01-25 | Resolved: 2023-03-07

## 2023-03-07 PROCEDURE — 99395 PREV VISIT EST AGE 18-39: CPT | Performed by: OBSTETRICS & GYNECOLOGY

## 2023-03-07 NOTE — PROGRESS NOTES
Subjective   Chief Complaint   Patient presents with   • Gynecologic Exam     Caden Downey is a 33 y.o. year old  presenting to be seen for her annual exam.      SEXUAL Hx:  She is not currently sexually active.  Her partner is in [place].  She is traveling there shortly.   In the past year there there has been NO new sexual partners.    Condoms are never used.  She would not like to be screened for STD's at today's exam.  Current birth control method: not using any form of contraception because she is currently trying to conceive.  She is happy with her current method of contraception and does not want to discuss alternative methods of contraception.  MENSTRUAL Hx:  Patient's last menstrual period was 2023.  In the past 6 months her cycles have been regular, predictable and occur monthly.  Her menstrual flow is typically normal.   Each month on average there are roughly 0 day(s) of very heavy flow.  Intermenstrual bleeding is absent.    Post-coital bleeding is absent.  Dysmenorrhea: is not affecting her activities of daily living  PMS: is not affecting her activities of daily living  Her cycles are not a source of concern for her that she wishes to discuss today.  HEALTH Hx:  She exercises regularly: yes.  She wears her seat belt: yes.  She has concerns about domestic violence: no.  OTHER THINGS SHE WANTS TO DISCUSS TODAY:  Nothing else    The following portions of the patient's history were reviewed and updated as appropriate:problem list, current medications, allergies, past family history, past medical history, past social history and past surgical history.    Social History    Tobacco Use      Smoking status: Never      Smokeless tobacco: Never    Review of Systems  Constitutional POS: nothing reported    NEG: anorexia or night sweats   Genitourinary POS: nothing reported    NEG: dysuria or hematuria      Gastointestinal POS: nothing reported    NEG: bloating, change in bowel habits, melena or  reflux symptoms   Integument POS: nothing reported    NEG: moles that are changing in size, shape, color or rashes   Breast POS: nothing reported    NEG: persistent breast lump, skin dimpling or nipple discharge        Objective   /70   Resp 14   Wt 67.6 kg (149 lb)   LMP 02/25/2023   BMI 30.09 kg/m²     General:  well developed; well nourished  no acute distress   Skin:  No suspicious lesions seen   Thyroid: normal to inspection and palpation   Breasts:  Examined in supine position  Symmetric without masses or skin dimpling  Nipples normal without inversion, lesions or discharge  There are no palpable axillary nodes   Abdomen: soft, non-tender; no masses  no umbilical or inguinal hernias are present  no hepato-splenomegaly   Pelvis: Clinical staff was present for exam  External genitalia:  normal appearance of the external genitalia including Bartholin's and Jay's glands.  :  urethral meatus normal;  Vaginal:  normal pink mucosa without prolapse or lesions.  Cervix:  normal appearance.  Uterus:  asymmetrically enlarged, consistent with 12 weeks size; multiple fibroids are present;  Adnexa:  non palpable bilaterally.  Rectal:  digital rectal exam not performed; anus visually normal appearing.        Assessment   1. Suspected uterine fibroids.  Review of prior ultrasound during pregnancy did not demonstrate fibroids.  2. Prior (+) pooled HPV with (-) 16/18/45  3. Pre-conceptional - she currently is taking sufficient folic acid  4. She is up to date on all relevant gynecologic and colorectal screenings     Plan   1. Pap and HPV were done today.  If she does not receive the results of the Pap within 2 weeks  time, she was instructed to call to find out the results.  I explained to Caden that the recommendations for Pap smear interval in a low risk patient's has lengthened up to 5 years time if both cytology and HPV testing were normal.  I encouraged her to be seen yearly for a full physical exam including  breast and pelvic exam even during the off years when PAP's will not be performed.  2. Ultrasound needs to be done after her next menses.   3. Folic acid for the prevention of neural tube defects was discussed.  At least 0.4 mg should be taken preconceptionally to reduce the risk.  It was explained that this may reduce the baseline incidence of neural tube defect by as much as 65%.  Folic acid can be found in OTC multivitamins, OTC prenatal vitamins or breakfast cereal that that contains 100% of the RDA of folate.  4. Her vaccine record was reviewed and updated.  5. I discussed with Caden that she may be behind on needed vaccinations for Influenza, TDAP and COVID booster.  She may be able to obtain these vaccinations at her local pharmacy OR speak about obtaining them with her primary care.  If she does obtain her vaccines, I have asked Caden to let us know the date each vaccine was obtained so that her medical record could be updated in our system.  6. The importance of keeping all planned follow-up and taking all medications as prescribed was emphasized.  7. Follow up for ultrasound after menses           This note was electronically signed.    Monster Marquez M.D.  March 7, 2023    Part of this note may be an electronic transcription/translation of spoken language to printed text using the Dragon Dictation System.

## 2023-03-07 NOTE — PATIENT INSTRUCTIONS
Preventing Birth Defects with Folic Acid  What is folic acid?  Folic acid is a vitamin that is used by the body to create new cells and keep the blood healthy. Everyone needs folic acid to stay healthy. It is especially important if you are pregnant.  Folic acid is artificial (synthetic). The vitamin in its natural form is called folate. Some foods are natural sources of folate, and other foods have folic acid added to them (fortified foods). You can also buy folic acid supplements or vitamins that contain folic acid.  Why is folic acid important during pregnancy?  Folic acid helps reduce your baby's risk of serious birth defects, especially the class of birth defects called neural tube defects (NTD).  Types of NTD's include:  Spina bifida. Spina bifida occurs when a baby's spinal column does not develop completely. This can cause serious, long-term (chronic) disabilities.  Anencephaly. This is a birth defect that causes your baby to be missing parts of the brain, scalp, and skull when he or she is born. Most babies born with anencephaly only live for a short amount of time.  How much folic acid do I need?  If you plan to become pregnant, you should take at least 400 mcg (micrograms) of folic acid daily. Birth defects usually occur in the earliest stages of pregnancy, often before you know you are pregnant. Taking folic acid when you start trying to become pregnant can help prevent birth defects.  While you are pregnant, you need at least 600 mcg of folic acid each day.  If you have had a child with a NTD, you may be recommended you take at least 5,000 mcg of folic acid daily.  If you take certain medications (certain medications for control of seizures) you made need more the 400 mcg.  What nutrition changes can be made?  To increase your intake of folate and folic acid, you may:  Eat more foods that are fortified with folic acid. Check food labels to see whether a food contains folic acid. Foods that are commonly  fortified with folic acid include:  Cereals.  Pastas.  Flours.  White rice.  Breads.  Eat more foods that are natural sources of folate, such as:  Legumes, including lentils, peas, and beans.  Nuts.  Vegetables, especially dark green leafy vegetables, such as spinach and mustard greens.  Citrus fruits and juices.  Your health care provider may still recommend that you take a supplement to make sure that you get enough to reduce the risk of birth defects.  Where to find support  Talk with your health care provider or your pharmacist to find a folic acid supplement that is right for you.  Your health care provider may recommend that you see a nutrition specialist (nutritionist). A nutritionist can help you make healthy food choices and get more folate from your diet.  Nutrition education programs may be available through your community health department.  Where to find more information  Visit the following websites to learn more about the importance of folic acid.  Centers for Disease Control and Prevention: www.cdc.gov/ncbddd/folicacid/about.html  The Office on Women's Health: womenshSocialwareth.gov/publications/our-publications/fact-sheet/folic-acid.html  National Institutes of Health: ods.od.nih.gov/factsheets/Folate-Consumer  The March of Dimes: www.marchofdimes.org/pregnancy/folic-acid.aspx  Summary  It is important to start taking folic acid when you are planning to become pregnant, because many birth defects can happen before you know that you are pregnant.  You can get more folate and folic acid from your diet. Look for certain foods that are fortified with folic acid.  Your health care provider may recommend that you take a supplement to get enough folic acid.    This information is not intended to replace advice given to you by your health care provider. Make sure you discuss any questions you have with your health care provider.  Document Released: 01/18/2017 Document Revised: 11/30/2018 Document Reviewed:  2017  Elsevier Patient Education ©  [employer]          Tdap Vaccine (Tetanus, Diphtheria and Pertussis): What You Need to Know      1. Why get vaccinated?  Tetanus, diphtheria and pertussis are very serious diseases. Tdap vaccine can protect us from these diseases. And, Tdap vaccine given to pregnant women can protect  babies against pertussis..  TETANUS (Lockjaw) is rare in the [place] today. It causes painful muscle tightening and stiffness, usually all over the body.  It can lead to tightening of muscles in the head and neck so you can't open your mouth, swallow, or sometimes even breathe. Tetanus kills about 1 out of 10 people who are infected even after receiving the best medical care.  DIPHTHERIA is also rare in the [place] today. It can cause a thick coating to form in the back of the throat.  It can lead to breathing problems, heart failure, paralysis, and death.  PERTUSSIS (Whooping Cough) causes severe coughing spells, which can cause difficulty breathing, vomiting and disturbed sleep.  It can also lead to weight loss, incontinence, and rib fractures. Up to 2 in 100 adolescents and 5 in 100 adults with pertussis are hospitalized or have complications, which could include pneumonia or death.    These diseases are caused by bacteria. Diphtheria and pertussis are spread from person to person through secretions from coughing or sneezing. Tetanus enters the body through cuts, scratches, or wounds.  Before vaccines, as many as 200,000 cases of diphtheria, 200,000 cases of pertussis, and hundreds of cases of tetanus, were reported in the [place] each year. Since vaccination began, reports of cases for tetanus and diphtheria have dropped by about 99% and for pertussis by about 80%.    2. Tdap vaccine  Tdap vaccine can protect adolescents and adults from tetanus, diphtheria, and pertussis. One dose of Tdap is routinely given at age 11 or 12. People who did not get Tdap at that  age should get it as soon as possible.  Tdap is especially important for healthcare professionals and anyone having close contact with a baby younger than 12 months.  Pregnant women should get a dose of Tdap during every pregnancy, to protect the  from pertussis. Infants are most at risk for severe, life-threatening complications from pertussis.  Another vaccine, called Td, protects against tetanus and diphtheria, but not pertussis. A Td booster should be given every 10 years. Tdap may be given as one of these boosters if you have never gotten Tdap before. Tdap may also be given after a severe cut or burn to prevent tetanus infection.  Your doctor or the person giving you the vaccine can give you more information.  Tdap may safely be given at the same time as other vaccines.    3. Some people should not get this vaccine  A person who has ever had a life-threatening allergic reaction after a previous dose of any diphtheria, tetanus or pertussis containing vaccine, OR has a severe allergy to any part of this vaccine, should not get Tdap vaccine. Tell the person giving the vaccine about any severe allergies.  Anyone who had coma or long repeated seizures within 7 days after a childhood dose of DTP or DTaP, or a previous dose of Tdap, should not get Tdap, unless a cause other than the vaccine was found. They can still get Td.  Talk to your doctor if you:  have seizures or another nervous system problem,  had severe pain or swelling after any vaccine containing diphtheria, tetanus or pertussis,  ever had a condition called Guillain-Barré Syndrome (GBS),  aren't feeling well on the day the shot is scheduled.    4. Risks  With any medicine, including vaccines, there is a chance of side effects. These are usually mild and go away on their own. Serious reactions are also possible but are rare.  Most people who get Tdap vaccine do not have any problems with it.  Mild problems following Tdap  (Did not interfere with  activities)  Pain where the shot was given (about 3 in 4 adolescents or 2 in 3 adults)  Redness or swelling where the shot was given (about 1 person in 5)  Mild fever of at least 100.4°F (up to about 1 in 25 adolescents or 1 in 100 adults)  Headache (about 3 or 4 people in 10)  Tiredness (about 1 person in 3 or 4)  Nausea, vomiting, diarrhea, stomach ache (up to 1 in 4 adolescents or 1 in 10 adults)  Chills, sore joints (about 1 person in 10)  Body aches (about 1 person in 3 or 4)  Rash, swollen glands (uncommon)  Moderate problems following Tdap  (Interfered with activities, but did not require medical attention)  Pain where the shot was given (up to 1 in 5 or 6)  Redness or swelling where the shot was given (up to about 1 in 16 adolescents or 1 in 12 adults)  Fever over 102°F (about 1 in 100 adolescents or 1 in 250 adults)  Headache (about 1 in 7 adolescents or 1 in 10 adults)  Nausea, vomiting, diarrhea, stomach ache (up to 1 or 3 people in 100)  Swelling of the entire arm where the shot was given (up to about 1 in 500).  Severe problems following Tdap  (Unable to perform usual activities; required medical attention)  Swelling, severe pain, bleeding and redness in the arm where the shot was given (rare).  Problems that could happen after any vaccine:  People sometimes faint after a medical procedure, including vaccination. Sitting or lying down for about 15 minutes can help prevent fainting, and injuries caused by a fall. Tell your doctor if you feel dizzy, or have vision changes or ringing in the ears.  Some people get severe pain in the shoulder and have difficulty moving the arm where a shot was given. This happens very rarely.  Any medication can cause a severe allergic reaction. Such reactions from a vaccine are very rare, estimated at fewer than 1 in a million doses, and would happen within a few minutes to a few hours after the vaccination.  As with any medicine, there is a very remote chance of a vaccine  causing a serious injury or death.  The safety of vaccines is always being monitored. For more information, visit: www.cdc.gov/vaccinesafety/    5. What if there is a serious problem?  What should I look for?  Look for anything that concerns you, such as signs of a severe allergic reaction, very high fever, or unusual behavior.  Signs of a severe allergic reaction can include hives, swelling of the face and throat, difficulty breathing, a fast heartbeat, dizziness, and weakness. These would usually start a few minutes to a few hours after the vaccination.  What should I do?  If you think it is a severe allergic reaction or other emergency that can't wait, call 9-1-1 or get the person to the nearest hospital. Otherwise, call your doctor.  Afterward, the reaction should be reported to the Vaccine Adverse Event Reporting System (VAERS). Your doctor might file this report, or you can do it yourself through the VAERS web site at www.vaers.hhs.gov, or by calling 1-948.950.1028.  [employer] does not give medical advice.    6. The National Vaccine Injury Compensation Program  The National Vaccine Injury Compensation Program (VICP) is a federal program that was created to compensate people who may have been injured by certain vaccines.  Persons who believe they may have been injured by a vaccine can learn about the program and about filing a claim by calling 1-744.220.3218 or visiting the VICP website at www.hrsa.gov/vaccinecompensation. There is a time limit to file a claim for compensation.    7. How can I learn more?  Ask your doctor. He or she can give you the vaccine package insert or suggest other sources of information.  Call your local or state health department.  Contact the Centers for Disease Control and Prevention (CDC):  Call 1-251.343.1354 (6-970-DLQ-INFO) or  Visit CDC's website at www.cdc.gov/vaccines      Vaccine Information Statement Tdap Vaccine (2/24/2015)  This information is not intended to replace advice  given to you by your health care provider. Make sure you discuss any questions you have with your health care provider.  Document Released: 06/18/2013 Document Revised: 08/05/2019 Document Reviewed: 08/05/2019  Elsevier Interactive Patient Education © 2019 Elsevier Inc.

## 2023-03-10 LAB — REF LAB TEST METHOD: NORMAL

## 2023-03-11 PROBLEM — R87.613 HGSIL ON CYTOLOGIC SMEAR OF CERVIX: Status: ACTIVE | Noted: 2023-03-09

## 2023-03-13 ENCOUNTER — TELEPHONE (OUTPATIENT)
Dept: OBSTETRICS AND GYNECOLOGY | Facility: CLINIC | Age: 33
End: 2023-03-13
Payer: COMMERCIAL

## 2023-04-06 ENCOUNTER — TELEPHONE (OUTPATIENT)
Dept: OBSTETRICS AND GYNECOLOGY | Facility: CLINIC | Age: 33
End: 2023-04-06
Payer: COMMERCIAL

## 2023-04-06 PROBLEM — D25.1 INTRAMURAL UTERINE FIBROID: Status: ACTIVE | Noted: 2023-04-06

## 2023-04-06 NOTE — TELEPHONE ENCOUNTER
Called and spoke with patient, she has been informed of information and advisement from Dr. Marquez.  She verified understanding.  She would like to know if this could affect her change of conceiving.

## 2023-04-06 NOTE — TELEPHONE ENCOUNTER
Please let patient know that she does have several small uterine fibroids but nothing that needs to be addressed at the current time.  Thank you.

## 2023-04-30 NOTE — PROGRESS NOTES
Colposcopy of cervix    Date of procedure:  4/30/2023   Risks and benefits discussed? yes   All questions answered? yes   Consents given by: patient   Written consent obtained? yes   Pre-op indication: HGSIL          Procedure documentation:  The cervix was initially viewed colposcopically through a green filter.  The cervix was next bathed in acetic acid.   The findings were as follows:    Transformation zone seen? No   Findings: 1. No visible lesions   Ectocervical biopsies: not obtained.   Endocervical curettage: not performed               Colposcopic Impression: 1. Normal appearing cervix w/o visible lesion  2. Inadequate colposcopy  3. Colposcopic findings are inconsistent with cytology       Plan: Schedule for [facility].         This note was electronically signed.    Monster Marquez M.D.  April 30, 2023

## 2023-05-02 ENCOUNTER — OFFICE VISIT (OUTPATIENT)
Dept: OBSTETRICS AND GYNECOLOGY | Facility: CLINIC | Age: 33
End: 2023-05-02
Payer: COMMERCIAL

## 2023-05-02 VITALS — BODY MASS INDEX: 29.49 KG/M2 | WEIGHT: 146 LBS | RESPIRATION RATE: 14 BRPM

## 2023-05-02 DIAGNOSIS — R87.613 HGSIL ON CYTOLOGIC SMEAR OF CERVIX: Primary | ICD-10-CM

## 2023-09-19 DIAGNOSIS — N91.2 AMENORRHEA: Primary | ICD-10-CM

## 2023-09-20 ENCOUNTER — TELEPHONE (OUTPATIENT)
Dept: OBSTETRICS AND GYNECOLOGY | Facility: CLINIC | Age: 33
End: 2023-09-20
Payer: COMMERCIAL

## 2023-09-20 NOTE — TELEPHONE ENCOUNTER
Spoke with pt and she is scheduled to come in and see Dr. Limon on 10/10/23 for her irregular cycles.  She also stated that she started her cycle on 9/19/23.

## 2023-10-10 ENCOUNTER — LAB (OUTPATIENT)
Dept: LAB | Facility: HOSPITAL | Age: 33
End: 2023-10-10
Payer: COMMERCIAL

## 2023-10-10 ENCOUNTER — OFFICE VISIT (OUTPATIENT)
Dept: OBSTETRICS AND GYNECOLOGY | Facility: CLINIC | Age: 33
End: 2023-10-10
Payer: COMMERCIAL

## 2023-10-10 VITALS — WEIGHT: 154 LBS | BODY MASS INDEX: 31.1 KG/M2 | SYSTOLIC BLOOD PRESSURE: 132 MMHG | DIASTOLIC BLOOD PRESSURE: 88 MMHG

## 2023-10-10 DIAGNOSIS — R87.613 HGSIL ON CYTOLOGIC SMEAR OF CERVIX: ICD-10-CM

## 2023-10-10 DIAGNOSIS — N97.9 SECONDARY FEMALE INFERTILITY: ICD-10-CM

## 2023-10-10 DIAGNOSIS — Z87.59 HISTORY OF PREGNANCY LOSS, NOT CURRENTLY PREGNANT: ICD-10-CM

## 2023-10-10 DIAGNOSIS — Z82.79 FAMILY HISTORY OF TRISOMY 13: ICD-10-CM

## 2023-10-10 DIAGNOSIS — N91.4 SECONDARY OLIGOMENORRHEA: Primary | ICD-10-CM

## 2023-10-10 DIAGNOSIS — N91.4 SECONDARY OLIGOMENORRHEA: ICD-10-CM

## 2023-10-10 LAB
FSH SERPL-ACNC: 3 MIU/ML
PROLACTIN SERPL-MCNC: 15.7 NG/ML (ref 4.79–23.3)
TSH SERPL DL<=0.05 MIU/L-ACNC: 1.47 UIU/ML (ref 0.27–4.2)

## 2023-10-10 PROCEDURE — 84146 ASSAY OF PROLACTIN: CPT

## 2023-10-10 PROCEDURE — 99213 OFFICE O/P EST LOW 20 MIN: CPT | Performed by: OBSTETRICS & GYNECOLOGY

## 2023-10-10 PROCEDURE — 83001 ASSAY OF GONADOTROPIN (FSH): CPT

## 2023-10-10 PROCEDURE — 84443 ASSAY THYROID STIM HORMONE: CPT

## 2023-10-10 NOTE — PROGRESS NOTES
Subjective   Chief Complaint   Patient presents with    Follow-up     Irregular cycles     Caden Downey is a 33 y.o. year old .  Patient's last menstrual period was 2023 (exact date).  She presents to be seen because of concern for irregular menses. Since around May she reports menstrual cycles have been occurring around every 30-43 days. Her duration of bleeding is usually about ~ 3-4 days.     She is currently sexually active. Her partner does live in [place] so intercourse is infrequent. This is her same partner from her last miscarriage. She is wanting to get pregnant.     She is requesting lab work today.  She was scheduled to see a genetic counselor given her history of trisomy 13 miscarriage.  This did not happen due to scheduling conflicts.  He also has a history of a high-grade Pap smear with an overall normal colposcopy by Dr. Marquez but was recommended to have a LEEP procedure.  This was discussed by him last May but she has yet to complete this.    Of note, patient reports she is meeting with an infertility specialist to discuss potential in vitro fertilization in January.    OTHER THINGS SHE WANTS TO DISCUSS TODAY:  Nothing else    The following portions of the patient's history were reviewed and updated as appropriate:current medications, allergies, past medical history, and past surgical history    Social History    Tobacco Use      Smoking status: Never      Smokeless tobacco: Never    Review of Systems  Constitutional POS: nothing reported    NEG: anorexia or night sweats   Genitourinary POS: nothing reported    NEG: dysuria or hematuria   Gastointestinal POS: nothing reported    NEG: bloating, change in bowel habits, melena, or reflux symptoms   Integument POS: nothing reported    NEG: moles that are changing in size, shape, color or rashes   Breast POS: nothing reported    NEG: persistent breast lump, skin dimpling, or nipple discharge         Objective   /88 (BP Location:  Right arm, Patient Position: Sitting, Cuff Size: Adult)   Wt 69.9 kg (154 lb)   LMP 09/19/2023 (Exact Date)   BMI 31.10 kg/mý     General:  well developed; well nourished  no acute distress   Skin:  Not performed.   Thyroid: not examined   Lungs:  breathing is unlabored   Heart:  Not performed.   Breasts:  Not performed.   Abdomen: Not performed.   Pelvis: Not performed.     Lab Review   PAP smear - HGSIL    Imaging   Pelvic ultrasound report        Assessment   Abnormal uterine bleeding ?  HGSIL pap smear - s/p colposcopy by Dr. 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  It was explained to Caden that all lab test should be back within the one week after they are performed. She will be notified about the results, regardless of the findings. If she has not been contacted by the office within 2 weeks after the test has been performed, it is her responsibility to contact us to learn about her results.  Reviewed at this point either directly proceeding with a LEEP procedure or getting her scheduled for repeat Pap smear and colposcopy with biopsies.  Patient elects for the latter.  We will get this scheduled today.  Referral placed again to genetic counseling.  Reviewed the importance of making sure her multivitamin has folic acid.  The importance of keeping all planned follow-up and taking all medications as prescribed was emphasized.  Follow-up for Pap smear and colposcopy with biopsies    No orders of the defined types were placed in this encounter.         This note was electronically signed.    Connie Limon MD  October 10, 2023

## 2023-10-11 DIAGNOSIS — N97.9 SECONDARY FEMALE INFERTILITY: Primary | ICD-10-CM

## 2023-10-16 ENCOUNTER — OFFICE VISIT (OUTPATIENT)
Dept: OBSTETRICS AND GYNECOLOGY | Facility: CLINIC | Age: 33
End: 2023-10-16
Payer: COMMERCIAL

## 2023-10-16 ENCOUNTER — LAB (OUTPATIENT)
Dept: LAB | Facility: HOSPITAL | Age: 33
End: 2023-10-16
Payer: COMMERCIAL

## 2023-10-16 VITALS — DIASTOLIC BLOOD PRESSURE: 82 MMHG | SYSTOLIC BLOOD PRESSURE: 120 MMHG | BODY MASS INDEX: 31.1 KG/M2 | WEIGHT: 154 LBS

## 2023-10-16 DIAGNOSIS — Z13.9 SPECIAL SCREENING: ICD-10-CM

## 2023-10-16 DIAGNOSIS — N97.9 SECONDARY FEMALE INFERTILITY: ICD-10-CM

## 2023-10-16 DIAGNOSIS — R87.613 HGSIL ON CYTOLOGIC SMEAR OF CERVIX: Primary | ICD-10-CM

## 2023-10-16 DIAGNOSIS — N91.2 AMENORRHEA: ICD-10-CM

## 2023-10-16 LAB
B-HCG UR QL: NEGATIVE
EXPIRATION DATE: NORMAL
INTERNAL NEGATIVE CONTROL: NEGATIVE
INTERNAL POSITIVE CONTROL: POSITIVE
Lab: NORMAL
PROGEST SERPL-MCNC: 14.5 NG/ML

## 2023-10-16 PROCEDURE — 57454 BX/CURETT OF CERVIX W/SCOPE: CPT | Performed by: OBSTETRICS & GYNECOLOGY

## 2023-10-16 PROCEDURE — 81025 URINE PREGNANCY TEST: CPT | Performed by: OBSTETRICS & GYNECOLOGY

## 2023-10-16 PROCEDURE — 84144 ASSAY OF PROGESTERONE: CPT

## 2023-10-16 NOTE — PROGRESS NOTES
Colposcopy    Date of procedure:  10/16/2023   Risks and benefits discussed? yes   All questions answered? yes   Consents given by: patient   Written consent obtained? yes   Pre-op indication: HGSIL; previous colpo performed but patient lost to follow up. Desires repeat pap smear and colposcopy.           Procedure documentation:  The cervix was initially viewed colposcopically through a green filter.  The cervix was next bathed in acetic acid.   The findings were as follows:    Transformation zone seen? Yes   Findings: Mosaicism noted at 9 o'clock   Ectocervical biopsies: taken from 4 o'clock, 9 o'clock, and 12 o'clock.  Monsels solution was applied to the biopsy sites.   Endocervical curettage: performed               Colposcopic Impression: Mild dysplasia   Adequate colposcopy  Await recent pap smear from today        Plan: Will base further treatment on pathology results and pap smear          This note was electronically signed.    Connie Limon MD  October 16, 2023

## 2023-10-19 LAB
REF LAB TEST METHOD: NORMAL
REF LAB TEST METHOD: NORMAL

## 2023-10-23 LAB — REF LAB TEST METHOD: NORMAL

## 2023-10-31 DIAGNOSIS — R87.613 HGSIL ON CYTOLOGIC SMEAR OF CERVIX: Primary | ICD-10-CM

## 2023-12-20 ENCOUNTER — TELEPHONE (OUTPATIENT)
Dept: GENETICS | Facility: HOSPITAL | Age: 33
End: 2023-12-20
Payer: COMMERCIAL

## 2023-12-20 NOTE — TELEPHONE ENCOUNTER
Called patient for genetic counseling appointment at 1:00PM. Patient was not in the state [facility] and plans to be rescheduled. Patient can contact genetics scheduling at [phone] to be re-scheduled for a later date when she is in the [facility].

## 2024-01-15 ENCOUNTER — TELEPHONE (OUTPATIENT)
Dept: OBSTETRICS AND GYNECOLOGY | Facility: CLINIC | Age: 34
End: 2024-01-15

## 2025-01-10 ENCOUNTER — LAB (OUTPATIENT)
Dept: LAB | Facility: HOSPITAL | Age: 35
End: 2025-01-10
Payer: COMMERCIAL

## 2025-01-10 ENCOUNTER — TRANSCRIBE ORDERS (OUTPATIENT)
Dept: LAB | Facility: HOSPITAL | Age: 35
End: 2025-01-10
Payer: COMMERCIAL

## 2025-01-10 DIAGNOSIS — N92.6 IRREGULAR MENSTRUAL CYCLE: ICD-10-CM

## 2025-01-10 DIAGNOSIS — N92.6 IRREGULAR MENSTRUAL CYCLE: Primary | ICD-10-CM

## 2025-01-10 LAB
ESTRADIOL SERPL HS-MCNC: 75.5 PG/ML
FSH SERPL-ACNC: 5.09 MIU/ML
HBA1C MFR BLD: 5.7 % (ref 4.8–5.6)

## 2025-01-10 PROCEDURE — 84403 ASSAY OF TOTAL TESTOSTERONE: CPT

## 2025-01-10 PROCEDURE — 83036 HEMOGLOBIN GLYCOSYLATED A1C: CPT

## 2025-01-10 PROCEDURE — 84402 ASSAY OF FREE TESTOSTERONE: CPT

## 2025-01-10 PROCEDURE — 83498 ASY HYDROXYPROGESTERONE 17-D: CPT

## 2025-01-10 PROCEDURE — 83001 ASSAY OF GONADOTROPIN (FSH): CPT

## 2025-01-10 PROCEDURE — 82670 ASSAY OF TOTAL ESTRADIOL: CPT

## 2025-01-10 PROCEDURE — 83525 ASSAY OF INSULIN: CPT

## 2025-01-10 PROCEDURE — 36415 COLL VENOUS BLD VENIPUNCTURE: CPT

## 2025-01-11 LAB — INSULIN SERPL-ACNC: 5.9 UIU/ML (ref 2.6–24.9)

## 2025-01-13 LAB
TESTOST FREE SERPL-MCNC: 4.8 PG/ML (ref 0–4.2)
TESTOST SERPL-MCNC: 57 NG/DL (ref 8–60)

## 2025-01-15 LAB — 17OHP SERPL-MCNC: 47 NG/DL

## (undated) DEVICE — VACURETTE CRV RIGD 12MM DISP

## (undated) DEVICE — ST COL BERKELY TBG

## (undated) DEVICE — TRAP TISS

## (undated) DEVICE — LEX D AND C: Brand: MEDLINE INDUSTRIES, INC.

## (undated) DEVICE — GLV SURG DERMASSURE GRN LF PF 7.0

## (undated) DEVICE — TBG W FLTR FOR BERKELEY SYSTEM